# Patient Record
Sex: MALE | Employment: FULL TIME | URBAN - METROPOLITAN AREA
[De-identification: names, ages, dates, MRNs, and addresses within clinical notes are randomized per-mention and may not be internally consistent; named-entity substitution may affect disease eponyms.]

---

## 2020-10-05 ENCOUNTER — CLINICAL SUPPORT (OUTPATIENT)
Dept: REHABILITATION | Facility: HOSPITAL | Age: 30
End: 2020-10-05
Attending: ORTHOPAEDIC SURGERY
Payer: COMMERCIAL

## 2020-10-05 DIAGNOSIS — M25.551 CHRONIC RIGHT HIP PAIN: ICD-10-CM

## 2020-10-05 DIAGNOSIS — G89.29 CHRONIC RIGHT HIP PAIN: ICD-10-CM

## 2020-10-05 PROCEDURE — 97161 PT EVAL LOW COMPLEX 20 MIN: CPT | Performed by: PHYSICAL THERAPIST

## 2020-10-05 PROCEDURE — 97140 MANUAL THERAPY 1/> REGIONS: CPT | Performed by: PHYSICAL THERAPIST

## 2020-10-05 PROCEDURE — 97110 THERAPEUTIC EXERCISES: CPT | Performed by: PHYSICAL THERAPIST

## 2020-10-05 NOTE — PLAN OF CARE
"[facility]  Physical Therapy Initial Evaluation    Name: Yuan Bains  Clinic Number: [id number]    Therapy Diagnosis:   Encounter Diagnosis   Name Primary?    Chronic right hip pain      Physician: Selwyn Gonzalez MD    Physician Orders: PT Eval and Treat   Medical Diagnosis: M25.551 (ICD-10-CM) - Right hip pain     Evaluation Date: 10/5/2020  Authorization Period Expiration:  12/31/2020  Plan of Care Certification Period: 1/5/2021  Visit # / Visits authorized: 1/ 20    Time In: 13:30  Time Out: 14:30  Total Billable Time: 60 minutes    Precautions: Standard    Subjective   Date of onset: 1 yr hx   History of current condition - Yuan reports injuring his R hip playing football noting "It felt like I pulled my groin"  Saw DC treated for "scoliosis" and did do 1.5 months of PT in [place] without improvement in his condition, moved to [facility] and saw Ortho, referred to PT again after reviewing xrays/MRI "small labral tear" in R hip   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  Yuan Bains  has no past surgical history on file.  PMH: (+) arrhythmia - related to caffeine use      Yuan currently has no medications in their medication list.    Review of patient's allergies indicates:  Not on File     Pain:  Current 3/10, worst 9/10, best 3/10   Location: right hip   Description: Aching  Aggravating Factors: Sitting and working out and sports   Easing Factors: rest    Prior Therapy: yes   Social History: NA   Occupation: computer work for [employer]   Prior Level of Function: I  Current Level of Function: restricted     Pts goals: "learn how to take care of my hip"     Objective     Observation: neg gait deviation, mild increase in L' lordosis     Range of Motion (Passive):   Hip Right Left   Flexion WFLs WFLs   Extension WFLs WFLs   Abduction WFLs WFLs   Adduction NT  NT    External Rotation 60  60   Internal Rotation 20 20     Strength: Hip   Right Left " Comment   Flexion 4-/5 5/5    Extension 4-/5 5/5    Abduction: 4-/5 5/5    Adduction 4-/5    5/5       External Rotation 4-/5    5/5       Internal Rotation 4-/5    5/5         Special Tests: (+) MARIE, equivocal FADIR, (+) modified Eagle test     Palpation: (-) TTP t/o R hip with high tone noted in adductor region       CMS Impairment/Limitation/Restriction for FOTO   Survey    Therapist reviewed FOTO scores for Yuan Bains on 10/5/2020.   FOTO documents entered into [employer] - see Media section.    Limitation Score: 69%  Category: Mobility       TREATMENT   Treatment Time In: 14:00  Treatment Time Out: 14:30  Total Treatment time separate from Evaluation time:30'    Yuan received therapeutic exercises to develop strength, endurance, ROM, flexibility and core stabilization for 15 minutes including:    Supine RF stretch 5x:15   butt winking 1x10:10   B bridge 1x10:10   Quad TA 1x10:10     Yuan received the following manual therapy techniques: Joint mobilizations were applied to the: R hip  for 10 minutes, including:    Long and short axis distraction grade III    Lateral glide grade III   Anterior glide grade III     Education     Home Exercises and Patient Education Provided    Education provided re:   - progress towards goals   - role of therapy in multi - disciplinary team, goals for therapy  No spiritual or educational barriers to learning provided    Written Home Exercises Provided: yes .  Exercises were reviewed and Yuan was able to demonstrate them prior to the end of the session.   Pt received a written copy of exercises to perform at home. Yuan demonstrated good  understanding of the education provided.     Assessment   Yuan is a 30 y.o. male referred to outpatient Physical Therapy with a medical diagnosis of chronic R hip pain . Pt presents with       Pain  Stiffness  Decreased ROM  Decreased strength   Decreased functional status       Pt prognosis is Good.   Pt will benefit from skilled outpatient Physical  Therapy to address the deficits stated above and in the chart below, provide pt/family education, and to maximize pt's level of independence.     Plan of care discussed with patient: Yes  Pt's spiritual, cultural and educational needs considered and pt agreeable to plan of care and goals as stated below:     Anticipated Barriers for therapy: none     Medical Necessity is demonstrated by the following  History  Co-morbidities and personal factors that may impact the plan of care Co-morbidities:   none     Personal Factors:   no deficits     low   Examination  Body Structures and Functions, activity limitations and participation restrictions that may impact the plan of care Body Regions:   lower extremities    Body Systems:    strength  motor control    Participation Restrictions:   Working out   Sports     Activity limitations:   Mobility  lifting and carrying objects    Self care  no deficits    Domestic Life  no deficits    Community and Social Life  recreation and leisure         low   Clinical Presentation stable and uncomplicated low   Decision Making/ Complexity Score: low     Goals     Short-Term Goals: 6 weeks  - The patient will be independent with initial home exercise program.  - The patient will increase strength by 1/2 muscle grade to perform ambulation and bathing and hygiene with pain < 5/10.    Long-Term Goals: 12  weeks  - The patient will be independent with home exercise program and symptom management.  -The patient will increase strength = to uninvolved knee  to perform ambulation, toileting, dressing and recreation/leisure activities   with pain < 2/10.      Plan   Certification Period: 10/5/2020 to 1/5/2021.    Outpatient Physical Therapy 1 times weekly for 12 weeks to include the following interventions: patient education, Manual Therapy, Moist Heat/ Ice, Neuromuscular Re-ed, Patient Education, Therapeutic Activites and Therapeutic Exercise.     Yuan Astudillo, PT

## 2020-10-20 ENCOUNTER — CLINICAL SUPPORT (OUTPATIENT)
Dept: REHABILITATION | Facility: HOSPITAL | Age: 30
End: 2020-10-20
Attending: ORTHOPAEDIC SURGERY
Payer: COMMERCIAL

## 2020-10-20 DIAGNOSIS — G89.29 CHRONIC RIGHT HIP PAIN: ICD-10-CM

## 2020-10-20 DIAGNOSIS — M25.551 CHRONIC RIGHT HIP PAIN: ICD-10-CM

## 2020-10-20 PROCEDURE — 97140 MANUAL THERAPY 1/> REGIONS: CPT | Performed by: PHYSICAL THERAPIST

## 2020-10-20 PROCEDURE — 97110 THERAPEUTIC EXERCISES: CPT | Performed by: PHYSICAL THERAPIST

## 2020-10-20 NOTE — PROGRESS NOTES
Physical Therapy Daily Treatment Note     Visit Date: 10/20/2020    Name: Yuan Bains  [facility] Number: [id number]    Therapy Diagnosis:   Encounter Diagnosis   Name Primary?    Chronic right hip pain      Physician: Selwyn Gonzalez MD    Physician Orders: PT Eval and Treat   Medical Diagnosis: M25.551 (ICD-10-CM) - Right hip pain     Evaluation Date: 10/5/2020  Authorization Period Expiration:  12/31/2020  Plan of Care Certification Period: 1/5/2021  Visit # / Visits authorized: 2/ 20    Time In: 13:30  Time Out: 14:30  Total Billable Time: 45 minutes    Precautions: Standard    Subjective      Pt reports:slight improvement in his condition after IE/HEP, but despite improved L glut activation, still feels significant weakness     he was compliant with home exercise program given last session.   Response to previous treatment:good   Functional change: none as yet     Pain: 0/10  Location: right hip      Objective     Measurements taken:   None taken this visit     received the following manual therapy techniques: Joint mobilizations were applied to the Right hip :  For 8  minutes.     Long axis distraction grade III  Short axis distraction grade III + hip ER   Lateral glide grade III  Anterior glide grade III    Yuan received therapeutic exercises to develop strength, endurance, flexibility and core stabilization for 30 minutes including:    Supine RF stretch 5x:15 R  Prone RF stretch 5x;15 R  1/2 kneeling RF stretch 5x:15 R  Butt winking 1x10:10   B bridge with R foot on med ball 2x10:10  Quad hip ext R/L 2x10  Quad donkey kick 2x10 R/L     Home Exercises Provided and Patient Education Provided     Education provided:   - none provided this visit     Written Home Exercises Provided: Patient instructed to cont prior HEP.  Exercises were reviewed and Yuan was able to demonstrate them prior to the end of the session.  Yuan demonstrated good  understanding of the education provided.     See EMR under hand out  for  exercises provided prior visit.      Assessment     klever Rx without increase in sxs, improved mobility post Rx in R hip     Yuan is progressing well towards his goals.   Pt prognosis is Good.     Pt will continue to benefit from skilled outpatient physical therapy to address the deficits listed in the problem list box on initial evaluation, provide pt/family education and to maximize pt's level of independence in the home and community environment.     Pt's spiritual, cultural and educational needs considered and pt agreeable to plan of care and goals.    Anticipated barriers to physical therapy none     Short-Term Goals: 6 weeks  - The patient will be independent with initial home exercise program.  - The patient will increase strength by 1/2 muscle grade to perform ambulation and bathing and hygiene with pain < 5/10.    Long-Term Goals: 12  weeks  - The patient will be independent with home exercise program and symptom management.  -The patient will increase strength = to uninvolved knee  to perform ambulation, toileting, dressing and recreation/leisure activities   with pain < 2/10.      Plan     Shoulder eval NV per script     Continue with established Plan of Care towards PT goals with focus on decreasing pain, increasing ROM, strength, neuromuscular control and functional status       Yuan Astudillo, PT

## 2020-11-03 ENCOUNTER — CLINICAL SUPPORT (OUTPATIENT)
Dept: REHABILITATION | Facility: HOSPITAL | Age: 30
End: 2020-11-03
Attending: ORTHOPAEDIC SURGERY
Payer: COMMERCIAL

## 2020-11-03 DIAGNOSIS — G89.29 CHRONIC LEFT SHOULDER PAIN: ICD-10-CM

## 2020-11-03 DIAGNOSIS — M25.512 CHRONIC LEFT SHOULDER PAIN: ICD-10-CM

## 2020-11-03 PROCEDURE — 97161 PT EVAL LOW COMPLEX 20 MIN: CPT | Performed by: PHYSICAL THERAPIST

## 2020-11-03 PROCEDURE — 97110 THERAPEUTIC EXERCISES: CPT | Performed by: PHYSICAL THERAPIST

## 2020-11-05 PROBLEM — G89.29 CHRONIC LEFT SHOULDER PAIN: Status: ACTIVE | Noted: 2020-11-05

## 2020-11-05 PROBLEM — M25.512 CHRONIC LEFT SHOULDER PAIN: Status: ACTIVE | Noted: 2020-11-05

## 2020-11-05 NOTE — PLAN OF CARE
"[facility]  Physical Therapy Initial Evaluation    Name: Yuan Bains  Clinic Number: [id number]    Therapy Diagnosis:   Encounter Diagnosis   Name Primary?    Chronic left shoulder pain      Physician: Selwyn Gonzalez MD    Physician Orders: PT Eval and Treat   Medical Diagnosis: M67.813 (ICD-10-CM) - Other specified disorders of tendon, right shoulder  (acutally the left)  Evaluation Date: 11/3/2020  Authorization Period Expiration: 12/31/2020  Plan of Care Certification Period: 2/3/2021  Visit # / Visits authorized: 2 / 20    Time In: 15:30  Time Out: 16:30  Total Billable Time: 60 minutes    Precautions: Standard    Subjective   Date of onset: 8/2020  History of current condition - Yuan reports that he was doing P90X workouts from March - July but in August, he began having L shoulder pain of insidious onset, this did not resolve and he saw MD, xrays (-) and no MRI performed, referred to PT   Pt is R arm dominant    No past medical history on file.  Yuan Bains  has no past surgical history on file.    Yuan currently has no medications in their medication list.    Review of patient's allergies indicates:  Not on File     Pain:  Current 2/10, worst 10/10, best 2/10   Location: left shoulder  "under the collar bone" and in AC joint    Description: Aching  Aggravating Factors: reaching [place], working out   Easing Factors: rest, HAbd, stretching pec minor /lat     Prior Therapy: none  Social History:  lives with family   Occupation: see hip eval   Prior Level of Function: I  Current Level of Function: restricted - unable to work out or  child      Pts goals: "learn exercises, improve ROM and decrease the pain"     Objective     Observation: fair unsupported sitting posture, obvious scap malpositioning, no muscle atrophy or guarding L shoulder     Range of Motion:   AROM Right Left Comment   Shoulder Elevatiom: 160 degrees 160 degrees    PROM Right Left Comment   Shoulder Flexion: 165 " "degrees 127 degrees    Shoulder Abduction: 180  degrees 90 degrees    Shoulder ER, 90°ABD: 105 degrees 55 degrees    Shoulder IR, 90° ABD: 50 degrees 33 degrees      FIR -5"     Strength:    Right Left Comment   Shoulder flexion: 5/5 3-/5    Shoulder Abduction: 5/5 3-/5    Shoulder ER: 5/5 4/5    Shoulder IR: 5/5 4/5      Scapular Control/Dyskinesis: yes    Special Tests: (+) Perez, (+) empty can     Palpation: (+) TTP axilla       CMS Impairment/Limitation/Restriction for FOTO  Survey    Therapist reviewed FOTO scores for Yuan Bains on 11/3/2020.   FOTO documents entered into [employer] - see Media section.    Limitation Score: 69%  Category: Carrying       TREATMENT   Treatment Time In: 16:00  Treatment Time Out: 16:30  Total Treatment time separate from Evaluation time:15'     Yuan received therapeutic exercises to develop strength, endurance and posture for 15 minutes including:    Supine lying over 1/2 roll x 7'   Trigger point release in axilla   y-t-w 3x10 0# off EOT  Prone scap repositioning 1x15:05  Prone s' ext / ER 3x15 0#    Education     Home Exercises Provided and Patient Education Provided         Education provided:   - rehab approach and role of scapula in s' function     Written Home Exercises Provided: yes.  Exercises were reviewed and Yuan was able to demonstrate them prior to the end of the session.  Yuan demonstrated good  understanding of the education provided.     See EMR under hand out  for exercises provided 11/3/2020.      Assessment   Yuan is a 30 y.o. male referred to outpatient Physical Therapy with a medical diagnosis of chronic L shoulder pain . Pt presents with       Pain  Decreased ROM  Decreased strength  Decreased functional status       Pt prognosis is Good.   Pt will benefit from skilled outpatient Physical Therapy to address the deficits stated above and in the chart below, provide pt/family education, and to maximize pt's level of independence.     Plan of care discussed with " patient: Yes  Pt's spiritual, cultural and educational needs considered and pt agreeable to plan of care and goals as stated below:     Anticipated Barriers for therapy: none     Medical Necessity is demonstrated by the following  History  Co-morbidities and personal factors that may impact the plan of care Co-morbidities:   none     Personal Factors:   no deficits     low   Examination  Body Structures and Functions, activity limitations and participation restrictions that may impact the plan of care Body Regions:   upper extremities    Body Systems:    ROM  strength  motor control    Participation Restrictions:   ADLs      Activity limitations:   Mobility  lifting and carrying objects    Self care  washing oneself (bathing, drying, washing hands)  dressing    Domestic Life  shopping  cooking  doing house work (cleaning house, washing dishes, laundry)    Community and Social Life  recreation and leisure         low   Clinical Presentation stable and uncomplicated low   Decision Making/ Complexity Score: low     Goals     Short-Term Goals: 6 weeks  - The patient will be independent with initial home exercise program.  - The patient will demonstrate good unsupported sitting posture with min verbal cues for 15 minutes.  - The patient will increase ROM 15 degrees to perform bathing and hygiene, grooming, toileting and dressing with pain < 010.  - The patient will increase strength by 1.2 muscle grade  to perform bathing and hygiene, grooming, toileting and dressing with pain < 0/10.    Long-Term Goals: 12 weeks  - The patient will be independent with home exercise program and symptom management.  - The patient will increase ROM = to uninvolved shoulder  to perform work duties and recreation/leisure activities   with pain < 0/10.  - The patient will increase strength = to uninvolved shoulder  to perform work duties and recreation/leisure activities   with pain < 0/10.      Plan   Certification Period: 11/3/2020 to  2/3/2021.    Outpatient Physical Therapy 1 times weekly for 3 months to include the following interventions: patient education, Manual Therapy, Moist Heat/ Ice, Neuromuscular Re-ed, Patient Education, Therapeutic Activites and Therapeutic Exercise.     Yuan Astudillo, PT

## 2020-11-30 ENCOUNTER — CLINICAL SUPPORT (OUTPATIENT)
Dept: REHABILITATION | Facility: HOSPITAL | Age: 30
End: 2020-11-30
Attending: ORTHOPAEDIC SURGERY
Payer: COMMERCIAL

## 2020-11-30 DIAGNOSIS — G89.29 CHRONIC RIGHT HIP PAIN: ICD-10-CM

## 2020-11-30 DIAGNOSIS — M25.551 CHRONIC RIGHT HIP PAIN: ICD-10-CM

## 2020-11-30 PROCEDURE — 97140 MANUAL THERAPY 1/> REGIONS: CPT | Performed by: PHYSICAL THERAPIST

## 2020-11-30 PROCEDURE — 97110 THERAPEUTIC EXERCISES: CPT | Performed by: PHYSICAL THERAPIST

## 2020-11-30 NOTE — PROGRESS NOTES
"  Physical Therapy Daily Treatment Note     Visit Date: 11/30/2020    Name: Yuan Sands Number: [id number]    Therapy Diagnosis:   Encounter Diagnosis   Name Primary?    Chronic right hip pain      Physician: Selwyn Gonzalez MD    Physician Orders: PT Eval and Treat   Medical Diagnosis: M25.551 (ICD-10-CM) - Right hip pain     Evaluation Date: 10/5/2020  Authorization Period Expiration:  12/31/2020  Plan of Care Certification Period: 1/5/2021  Visit # / Visits authorized: 3/ 20    Time In: 15:30  Time Out: 16:30  Total Billable Time: 45 minutes    Precautions: Standard    Subjective      Pt reports:"My shoulder feel amazingly better but I'm still having pain in my hip"      he was compliant with home exercise program given last session.   Response to previous treatment:good   Functional change: none as yet     Pain:  NA / 10   Location: right hip      Objective     Measurements taken:   None taken this visit     received the following manual therapy techniques: Joint mobilizations were applied to the Right hip :  For 8  minutes.     Long axis distraction grade III    Yuan received therapeutic exercises to develop strength, endurance, flexibility and core stabilization for 30 minutes including:    Bike x 10' 5.0 seat at 11  Supine RF stretch 5x:15 R  Prone RF stretch 5x;15 R  1/2 kneeling RF stretch 5x:15 R  B bridge with back on wt bench 3x10:05   Hip abd band walks 3xburn R/L green  1/2 kneeling chop 2x10 R/L 7p     Home Exercises Provided and Patient Education Provided     Education provided:   - none provided this visit     Written Home Exercises Provided: Patient instructed to cont prior HEP.  Exercises were reviewed and Yuan was able to demonstrate them prior to the end of the session.  Yuan demonstrated good  understanding of the education provided.     See EMR under hand out  for exercises provided prior visit.      Assessment     klever Rx without increase in sxs, improved glut activation     Yuan is " progressing well towards his goals.   Pt prognosis is Good.     Pt will continue to benefit from skilled outpatient physical therapy to address the deficits listed in the problem list box on initial evaluation, provide pt/family education and to maximize pt's level of independence in the home and community environment.     Pt's spiritual, cultural and educational needs considered and pt agreeable to plan of care and goals.    Anticipated barriers to physical therapy none     Short-Term Goals: 6 weeks  - The patient will be independent with initial home exercise program.  - The patient will increase strength by 1/2 muscle grade to perform ambulation and bathing and hygiene with pain < 5/10.    Long-Term Goals: 12  weeks  - The patient will be independent with home exercise program and symptom management.  -The patient will increase strength = to uninvolved knee  to perform ambulation, toileting, dressing and recreation/leisure activities   with pain < 2/10.      Plan     Focus on decreasing pain and increasing glut/core activation in R hip condition     Continue with established Plan of Care towards PT goals with focus on decreasing pain, increasing ROM, strength, neuromuscular control and functional status       Yuan Astudillo, PT

## 2020-12-01 RX ORDER — PROPOFOL 10 MG/ML
INJECTION, EMULSION INTRAVENOUS
Status: DISPENSED
Start: 2020-12-01 | End: 2020-12-02

## 2020-12-14 ENCOUNTER — CLINICAL SUPPORT (OUTPATIENT)
Dept: REHABILITATION | Facility: HOSPITAL | Age: 30
End: 2020-12-14
Attending: ORTHOPAEDIC SURGERY
Payer: COMMERCIAL

## 2020-12-14 DIAGNOSIS — G89.29 CHRONIC RIGHT HIP PAIN: ICD-10-CM

## 2020-12-14 DIAGNOSIS — M25.551 CHRONIC RIGHT HIP PAIN: ICD-10-CM

## 2020-12-14 PROCEDURE — 97110 THERAPEUTIC EXERCISES: CPT | Performed by: PHYSICAL THERAPIST

## 2020-12-14 NOTE — PROGRESS NOTES
Physical Therapy Daily Treatment Note     Visit Date: 12/14/2020    Name: Yuan Sands Number: [id number]    Therapy Diagnosis:   Muscle weakness lower extremity     Physician: Selwyn Gonzalez MD    Physician Orders: PT Eval and Treat   Medical Diagnosis: M25.551 (ICD-10-CM) - Right hip pain     Evaluation Date: 10/5/2020  Authorization Period Expiration:  12/31/2020  Plan of Care Certification Period: 1/5/2021  Visit # / Visits authorized: 4/ 20    Time In: 15:30  Time Out: 16:30  Total Billable Time: 45 minutes    Precautions: Standard    Subjective      Pt reports:improvement in R hip condition, reiterated his desire to return to functional fitness training     he was compliant with home exercise program given last session.   Response to previous treatment:good   Functional change: none as yet     Pain:  NA / 10   Location: right hip      Objective     Measurements taken:   Decreased Hamstring flexibility R/L with inability to touch toes in st or long sit      Yuan received therapeutic exercises to develop strength, endurance, flexibility and core stabilization for 45 minutes including:    Plum cook band assisted curl up 2x10  Inch worms 2x3  Lunge series dynamic stretching   OHDS core assist 1x5  OHDS glut assist facing wall 1x5  T' rotation in L' locked position 1x3 PT AA  Open book T' stretch 3xs R/L        Home Exercises Provided and Patient Education Provided     Education provided:   - none provided this visit     Written Home Exercises Provided: Patient instructed to cont prior HEP.  Exercises were reviewed and Yuan was able to demonstrate them prior to the end of the session.  Yuan demonstrated good  understanding of the education provided.     See EMR under hand out  for exercises provided prior visit.      Assessment     klever Rx without increase in sxs, improved squat technique and ham flexibility following core activation     Yuan is progressing well towards his goals.   Pt prognosis is Good.      Pt will continue to benefit from skilled outpatient physical therapy to address the deficits listed in the problem list box on initial evaluation, provide pt/family education and to maximize pt's level of independence in the home and community environment.     Pt's spiritual, cultural and educational needs considered and pt agreeable to plan of care and goals.    Anticipated barriers to physical therapy none     Short-Term Goals: 6 weeks  - The patient will be independent with initial home exercise program.  - The patient will increase strength by 1/2 muscle grade to perform ambulation and bathing and hygiene with pain < 5/10.    Long-Term Goals: 12  weeks  - The patient will be independent with home exercise program and symptom management.  -The patient will increase strength = to uninvolved knee  to perform ambulation, toileting, dressing and recreation/leisure activities   with pain < 2/10.      Plan     Focus on decreasing pain and increasing glut/core activation in R hip condition     Continue with established Plan of Care towards PT goals with focus on decreasing pain, increasing ROM, strength, neuromuscular control and functional status       Yuan Astudillo, PT

## 2020-12-22 ENCOUNTER — CLINICAL SUPPORT (OUTPATIENT)
Dept: REHABILITATION | Facility: HOSPITAL | Age: 30
End: 2020-12-22
Attending: ORTHOPAEDIC SURGERY
Payer: COMMERCIAL

## 2020-12-22 DIAGNOSIS — M25.551 CHRONIC RIGHT HIP PAIN: ICD-10-CM

## 2020-12-22 DIAGNOSIS — G89.29 CHRONIC RIGHT HIP PAIN: ICD-10-CM

## 2020-12-22 PROCEDURE — 97110 THERAPEUTIC EXERCISES: CPT | Performed by: PHYSICAL THERAPIST

## 2020-12-23 NOTE — PROGRESS NOTES
Physical Therapy Daily Treatment Note     Visit Date: 12/22/2020    Name: Yuan Bains  Clinic Number: [id number]    Therapy Diagnosis:   Muscle weakness lower extremity     Physician: Selwyn Gonzalez MD    Physician Orders: PT Eval and Treat   Medical Diagnosis: M25.551 (ICD-10-CM) - Right hip pain     Evaluation Date: 10/5/2020  Authorization Period Expiration:  12/31/2020  Plan of Care Certification Period: 1/5/2021  Visit # / Visits authorized: 5/ 20    Time In: 10:30  Time Out: 11:30  Total Billable Time: 45 minutes    Precautions: Standard    Subjective      Pt reports:continued progress in his R hip condition     he was compliant with home exercise program given last session.   Response to previous treatment:good   Functional change: none as yet     Pain:  NA / 10   Location: right hip      Objective     Measurements taken:  Continued  Decreased Hamstring flexibility R/L with inability to touch toes in st or long sit      Yuan received therapeutic exercises to develop strength, endurance, flexibility and core stabilization for 45 minutes including:    yll cook band assisted curl up 2x10  Toe touch movement pattern with yll cook band FW 1x10 and facing away 1x10  1/2 kneeling cc lifting facing forward 3x10 R/L 3p   Single leg ground touch 3x5 R/L    grade III long axis distraction R hip     Home Exercises Provided and Patient Education Provided     Education provided:   - none provided this visit     Written Home Exercises Provided: Patient instructed to cont prior HEP.  Exercises were reviewed and Yuan was able to demonstrate them prior to the end of the session.  Yuan demonstrated good  understanding of the education provided.     See EMR under hand out  for exercises provided prior visit.      Assessment     klever Rx without increase in sxs except pt had R hip pain on 3rd set of ground touch, significant improvement in ham flexibility following core activation     Yuan is progressing well towards his goals.    Pt prognosis is Good.     Pt will continue to benefit from skilled outpatient physical therapy to address the deficits listed in the problem list box on initial evaluation, provide pt/family education and to maximize pt's level of independence in the home and community environment.     Pt's spiritual, cultural and educational needs considered and pt agreeable to plan of care and goals.    Anticipated barriers to physical therapy none     Short-Term Goals: 6 weeks  - The patient will be independent with initial home exercise program.  - The patient will increase strength by 1/2 muscle grade to perform ambulation and bathing and hygiene with pain < 5/10.    Long-Term Goals: 12  weeks  - The patient will be independent with home exercise program and symptom management.  -The patient will increase strength = to uninvolved knee  to perform ambulation, toileting, dressing and recreation/leisure activities   with pain < 2/10.      Plan     Focus on decreasing pain and increasing glut/core activation in R hip condition     Continue with established Plan of Care towards PT goals with focus on decreasing pain, increasing ROM, strength, neuromuscular control and functional status       Yuan Astudillo, PT

## 2020-12-31 ENCOUNTER — CLINICAL SUPPORT (OUTPATIENT)
Dept: REHABILITATION | Facility: HOSPITAL | Age: 30
End: 2020-12-31
Attending: ORTHOPAEDIC SURGERY
Payer: COMMERCIAL

## 2020-12-31 DIAGNOSIS — G89.29 CHRONIC RIGHT HIP PAIN: ICD-10-CM

## 2020-12-31 DIAGNOSIS — M25.551 CHRONIC RIGHT HIP PAIN: ICD-10-CM

## 2020-12-31 PROCEDURE — 97530 THERAPEUTIC ACTIVITIES: CPT | Performed by: PHYSICAL THERAPIST

## 2020-12-31 NOTE — PROGRESS NOTES
Physical Therapy Daily Treatment Note     Visit Date: 12/31/2020    Name: Yuan Sands Number: [id number]    Therapy Diagnosis:   Muscle weakness lower extremity     Physician: Selwyn Gonzalez MD    Physician Orders: PT Eval and Treat   Medical Diagnosis: M25.551 (ICD-10-CM) - Right hip pain     Evaluation Date: 10/5/2020  Authorization Period Expiration:  12/31/2020  Plan of Care Certification Period: 1/5/2021  Visit # / Visits authorized: 6/ 20    Time In: 10:30  Time Out: 11:30  Total Billable Time: 45 minutes    Precautions: Standard    Subjective      Pt reports:no new c/o this AM in R hip, willing to attempt Adele as tolerated     he was compliant with home exercise program given last session.   Response to previous treatment:good   Functional change: none as yet     Pain:  NA / 10   Location: right hip      Objective     Measurements taken:  None taken this visit     Patient participated in dynamic functional therapeutic activities to improve functional performance for 45  minutes. Including:     ET x 10'   band assisted curl up 2x10  Dynamic warm up   LE dynamic stretch   Shuttle jump series:  B jumps 2b 3x15  U jumps 1b 3x10  alt jumps 1b 2x10  Ladder series 5d x 2  theragun to R L' paraspinals     Home Exercises Provided and Patient Education Provided     Education provided:   - none provided this visit     Written Home Exercises Provided: Patient instructed to cont prior HEP.  Exercises were reviewed and Yuan was able to demonstrate them prior to the end of the session.  Yuan demonstrated good  understanding of the education provided.     See EMR under hand out  for exercises provided prior visit.      Assessment     klever Rx without increase in sxs except for high tone/soreness in R L'  Region, relieved with theragun treatment     Yuan is progressing well towards his goals.   Pt prognosis is Good.     Pt will continue to benefit from skilled outpatient physical therapy to address the deficits  listed in the problem list box on initial evaluation, provide pt/family education and to maximize pt's level of independence in the home and community environment.     Pt's spiritual, cultural and educational needs considered and pt agreeable to plan of care and goals.    Anticipated barriers to physical therapy none     Short-Term Goals: 6 weeks  - The patient will be independent with initial home exercise program.  - The patient will increase strength by 1/2 muscle grade to perform ambulation and bathing and hygiene with pain < 5/10.    Long-Term Goals: 12  weeks  - The patient will be independent with home exercise program and symptom management.  -The patient will increase strength = to uninvolved knee  to perform ambulation, toileting, dressing and recreation/leisure activities   with pain < 2/10.      Plan     Focus on decreasing pain and increasing glut/core activation in R hip condition     Continue with established Plan of Care towards PT goals with focus on decreasing pain, increasing ROM, strength, neuromuscular control and functional status       Yuan Astudillo, PT

## 2021-01-04 ENCOUNTER — CLINICAL SUPPORT (OUTPATIENT)
Dept: REHABILITATION | Facility: HOSPITAL | Age: 31
End: 2021-01-04
Attending: ORTHOPAEDIC SURGERY
Payer: COMMERCIAL

## 2021-01-04 DIAGNOSIS — G89.29 CHRONIC RIGHT HIP PAIN: ICD-10-CM

## 2021-01-04 DIAGNOSIS — M25.551 CHRONIC RIGHT HIP PAIN: ICD-10-CM

## 2021-01-04 PROCEDURE — 97530 THERAPEUTIC ACTIVITIES: CPT | Performed by: PHYSICAL THERAPIST

## 2021-01-11 ENCOUNTER — CLINICAL SUPPORT (OUTPATIENT)
Dept: REHABILITATION | Facility: HOSPITAL | Age: 31
End: 2021-01-11
Attending: ORTHOPAEDIC SURGERY
Payer: COMMERCIAL

## 2021-01-11 DIAGNOSIS — G89.29 CHRONIC RIGHT HIP PAIN: ICD-10-CM

## 2021-01-11 DIAGNOSIS — M25.551 CHRONIC RIGHT HIP PAIN: ICD-10-CM

## 2021-01-11 PROCEDURE — 97530 THERAPEUTIC ACTIVITIES: CPT | Performed by: PHYSICAL THERAPIST

## 2021-01-20 ENCOUNTER — CLINICAL SUPPORT (OUTPATIENT)
Dept: REHABILITATION | Facility: HOSPITAL | Age: 31
End: 2021-01-20
Attending: ORTHOPAEDIC SURGERY
Payer: COMMERCIAL

## 2021-01-20 DIAGNOSIS — G89.29 CHRONIC RIGHT HIP PAIN: ICD-10-CM

## 2021-01-20 DIAGNOSIS — M25.551 CHRONIC RIGHT HIP PAIN: ICD-10-CM

## 2021-01-20 PROCEDURE — 97530 THERAPEUTIC ACTIVITIES: CPT | Performed by: PHYSICAL THERAPIST

## 2021-01-26 ENCOUNTER — CLINICAL SUPPORT (OUTPATIENT)
Dept: REHABILITATION | Facility: HOSPITAL | Age: 31
End: 2021-01-26
Attending: ORTHOPAEDIC SURGERY
Payer: COMMERCIAL

## 2021-01-26 DIAGNOSIS — M25.551 CHRONIC RIGHT HIP PAIN: ICD-10-CM

## 2021-01-26 DIAGNOSIS — G89.29 CHRONIC RIGHT HIP PAIN: ICD-10-CM

## 2021-01-26 PROCEDURE — 97530 THERAPEUTIC ACTIVITIES: CPT | Performed by: PHYSICAL THERAPIST

## 2021-08-06 ENCOUNTER — IMMUNIZATION (OUTPATIENT)
Dept: INTERNAL MEDICINE | Facility: CLINIC | Age: 31
End: 2021-08-06
Payer: COMMERCIAL

## 2021-08-06 DIAGNOSIS — Z23 NEED FOR VACCINATION: Primary | ICD-10-CM

## 2021-08-06 PROCEDURE — 91300 COVID-19, MRNA, LNP-S, PF, 30 MCG/0.3 ML DOSE VACCINE: CPT | Mod: ,,, | Performed by: INTERNAL MEDICINE

## 2021-08-06 PROCEDURE — 0001A COVID-19, MRNA, LNP-S, PF, 30 MCG/0.3 ML DOSE VACCINE: ICD-10-PCS | Mod: CV19,,, | Performed by: INTERNAL MEDICINE

## 2021-08-06 PROCEDURE — 0001A COVID-19, MRNA, LNP-S, PF, 30 MCG/0.3 ML DOSE VACCINE: CPT | Mod: CV19,,, | Performed by: INTERNAL MEDICINE

## 2021-08-06 PROCEDURE — 91300 COVID-19, MRNA, LNP-S, PF, 30 MCG/0.3 ML DOSE VACCINE: ICD-10-PCS | Mod: ,,, | Performed by: INTERNAL MEDICINE

## 2021-09-20 ENCOUNTER — TELEPHONE (OUTPATIENT)
Dept: INTERNAL MEDICINE | Facility: CLINIC | Age: 31
End: 2021-09-20

## 2021-09-27 ENCOUNTER — IMMUNIZATION (OUTPATIENT)
Dept: INTERNAL MEDICINE | Facility: CLINIC | Age: 31
End: 2021-09-27
Payer: COMMERCIAL

## 2021-09-27 DIAGNOSIS — Z23 NEED FOR VACCINATION: Primary | ICD-10-CM

## 2021-09-27 PROCEDURE — 91300 COVID-19, MRNA, LNP-S, PF, 30 MCG/0.3 ML DOSE VACCINE: CPT | Mod: PBBFAC | Performed by: INTERNAL MEDICINE

## 2021-09-27 PROCEDURE — 0002A COVID-19, MRNA, LNP-S, PF, 30 MCG/0.3 ML DOSE VACCINE: CPT | Mod: PBBFAC | Performed by: INTERNAL MEDICINE

## 2023-03-16 ENCOUNTER — OFFICE VISIT (OUTPATIENT)
Dept: URGENT CARE | Facility: CLINIC | Age: 33
End: 2023-03-16
Payer: COMMERCIAL

## 2023-03-16 VITALS
SYSTOLIC BLOOD PRESSURE: 135 MMHG | TEMPERATURE: 98 F | HEIGHT: 74 IN | OXYGEN SATURATION: 98 % | RESPIRATION RATE: 17 BRPM | WEIGHT: 175 LBS | BODY MASS INDEX: 22.46 KG/M2 | HEART RATE: 76 BPM | DIASTOLIC BLOOD PRESSURE: 87 MMHG

## 2023-03-16 DIAGNOSIS — J32.9 SINUSITIS, UNSPECIFIED CHRONICITY, UNSPECIFIED LOCATION: ICD-10-CM

## 2023-03-16 DIAGNOSIS — J02.9 SORE THROAT: ICD-10-CM

## 2023-03-16 DIAGNOSIS — J31.0 RHINITIS, UNSPECIFIED TYPE: Primary | ICD-10-CM

## 2023-03-16 LAB
CTP QC/QA: YES
MOLECULAR STREP A: NEGATIVE

## 2023-03-16 PROCEDURE — 87651 STREP A DNA AMP PROBE: CPT | Mod: QW,S$GLB,, | Performed by: NURSE PRACTITIONER

## 2023-03-16 PROCEDURE — 99203 OFFICE O/P NEW LOW 30 MIN: CPT | Mod: S$GLB,,, | Performed by: NURSE PRACTITIONER

## 2023-03-16 PROCEDURE — 87651 POCT STREP A MOLECULAR: ICD-10-PCS | Mod: QW,S$GLB,, | Performed by: NURSE PRACTITIONER

## 2023-03-16 PROCEDURE — 99203 PR OFFICE/OUTPT VISIT, NEW, LEVL III, 30-44 MIN: ICD-10-PCS | Mod: S$GLB,,, | Performed by: NURSE PRACTITIONER

## 2023-03-16 RX ORDER — FLUTICASONE PROPIONATE 50 MCG
2 SPRAY, SUSPENSION (ML) NASAL DAILY
Qty: 15.8 ML | Refills: 2 | Status: SHIPPED | OUTPATIENT
Start: 2023-03-16 | End: 2023-03-23

## 2023-03-16 RX ORDER — DEXTROAMPHETAMINE SULFATE 15 MG/1
15 CAPSULE, EXTENDED RELEASE ORAL EVERY MORNING
COMMUNITY
Start: 2023-02-14

## 2023-03-16 NOTE — PATIENT INSTRUCTIONS
See additional patient Instructions provided    - Rest.    - Drink plenty of fluids.  - Bacterial infections can be treated with oral antibiotics, however, Viral upper respiratory infections will not respond to antibiotics but with simple symptomatic care, typically run their course in 10-14 days.     - Tylenol or Ibuprofen as directed as needed for fever/pain. Avoid tylenol if you have a history of liver disease. Do not take ibuprofen if you have a history of GI bleeding, kidney disease, or if you take blood thinners.     - You can take over-the-counter claritin, zyrtec, allegra, or xyzal as directed. These are antihistamines that can help with runny nose, nasal congestion, sneezing, and helps to dry up post-nasal drip, which usually causes sore throat and cough.   - If you do NOT have high blood pressure, you may use a decongestant form (D)  of this medication (ie. Claritin- D, zyrtec-D, allegra-D) or if you do not take the D form, you can take sudafed (pseudoephedrine) over the counter, which is a decongestant. Do NOT take two decongestant (D) medications at the same time (such as mucinex-D and claritin-D or plain sudafed and claritin D)    - You can use Flonase (fluticasone) nasal spray as directed for sinus congestion and postnasal drip. This is a steroid nasal spray that works locally over time to decrease the inflammation in your nose/sinuses and help with allergic symptoms. This is not an quick- relief spray like afrin, but it works well if used daily.  Discontinue if you develop nose bleed  - use nasal saline prior to Flonase.  - Use Ocean Spray Nasal Saline 1-3 puffs each nostril every 2-3 hours then blow out onto tissue. This is to irrigate the nasal passage way to clear the sinus openings. Use until sinus problem resolved.    - you can take plain Mucinex (guaifenesin) 1200 mg twice a day to help loosen mucous.     -warm salt water gargles can help with sore throat    - warm tea with honey can help with  cough. Honey is a natural cough suppressant.    - Dextromethorphan (DM) is a cough suppressant over the counter (ie. mucinex DM, robitussin, delsym; dayquil/nyquil has DM as well.)    - Follow up with your PCP or specialty clinic as directed in the next 1-2 weeks if not improved or as needed.  You can call [phone] to schedule an appointment with the appropriate provider.      - Go to the ER if you develop new or worsening symptoms.     - You must understand that you have received an Urgent Care treatment only and that you may be released before all of your medical problems are known or treated.   - You, the patient, will arrange for follow up care as instructed.   - If your condition worsens or fails to improve we recommend that you receive another evaluation at the ER immediately or contact your PCP to discuss your concerns or return here.     Patient Instructions   - You must understand that you have received an Urgent Care treatment only and that you may be released before all of your medical problems are known or treated.   - You, the patient, will arrange for follow up care as instructed.   - If your condition worsens or fails to improve we recommend that you receive another evaluation at the ER immediately or contact your PCP to discuss your concerns or return here.     Advised on return/follow-up precautions. Advised on ER precautions. Answered all patient questions. Patient verbalized understanding and voiced agreement with current treatment plan.

## 2023-03-16 NOTE — PROGRESS NOTES
"Subjective:       Patient ID: Yuan Bains is a 32 y.o. male.    Vitals:  height is 6' 2" (1.88 m) and weight is 79.4 kg (175 lb). His temperature is 98 °F (36.7 °C). His blood pressure is 135/87 and his pulse is 76. His respiration is 17 and oxygen saturation is 98%.     Chief Complaint: Sore Throat    This is a 32 y.o. male who presents today with a chief complaint of   Sore throat for about a week, hoarse voice. 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  Daughter recently dx with strep    Sore Throat   This is a new problem. The current episode started in the past 7 days. The problem has been gradually improving. There has been no fever. The pain is at a severity of 2/10. The pain is mild. Associated symptoms include congestion. Pertinent negatives include no abdominal pain, coughing, diarrhea, ear discharge, ear pain, headaches, neck pain, shortness of breath, trouble swallowing or vomiting. He has had no exposure to strep. He has tried gargles and cool liquids for the symptoms. The treatment provided no relief.     Constitution: Negative for chills, sweating, fatigue and fever.   HENT:  Positive for congestion, postnasal drip and sore throat. Negative for ear pain, ear discharge, sinus pain, sinus pressure, trouble swallowing and voice change.    Neck: Negative for neck pain and painful lymph nodes.   Cardiovascular:  Negative for chest pain, palpitations and sob on exertion.   Respiratory:  Negative for chest tightness, cough, sputum production, shortness of breath and wheezing.    Gastrointestinal:  Negative for abdominal pain, nausea, vomiting and diarrhea.   Musculoskeletal:  Negative for muscle ache.   Skin:  Negative for color change, pale and rash.   Allergic/Immunologic: Negative for sneezing.   Neurological:  Negative for headaches.   Hematologic/Lymphatic: Negative for swollen lymph nodes.     Objective:      Physical Exam   Constitutional: He is oriented to person, place, and time. He appears " well-developed. He is cooperative.  Non-toxic appearance. He does not appear ill. No distress.   HENT:   Head: Normocephalic and atraumatic.   Ears:   Right Ear: Hearing, tympanic membrane, external ear and ear canal normal.   Left Ear: Hearing, tympanic membrane, external ear and ear canal normal.   Nose: Nose normal. No mucosal edema, rhinorrhea, nasal deformity or congestion. No epistaxis. Right sinus exhibits no maxillary sinus tenderness and no frontal sinus tenderness. Left sinus exhibits no maxillary sinus tenderness and no frontal sinus tenderness.   Mouth/Throat: Uvula is midline and mucous membranes are normal. No trismus in the jaw. Normal dentition. No uvula swelling. Posterior oropharyngeal erythema present. No oropharyngeal exudate or posterior oropharyngeal edema.   Eyes: Conjunctivae and lids are normal. No scleral icterus.   Neck: Trachea normal and phonation normal. Neck supple. No edema present. No erythema present. No neck rigidity present.   Cardiovascular: Normal rate, regular rhythm and normal heart sounds.   Pulmonary/Chest: Effort normal and breath sounds normal. No stridor. No respiratory distress. He has no decreased breath sounds. He has no wheezes. He has no rhonchi. He has no rales. He exhibits no tenderness.   Abdominal: Normal appearance.   Musculoskeletal: Normal range of motion.         General: No deformity. Normal range of motion.      Cervical back: He exhibits no tenderness.   Lymphadenopathy:     He has cervical adenopathy.   Neurological: He is alert and oriented to person, place, and time. He exhibits normal muscle tone. Coordination normal.   Skin: Skin is warm, dry, intact, not diaphoretic and not pale.   Psychiatric: His speech is normal and behavior is normal. Judgment and thought content normal.   Nursing note and vitals reviewed.      Results for orders placed or performed in visit on 03/16/23   POCT Strep A, Molecular   Result Value Ref Range    Molecular Strep A, POC  Negative Negative     Acceptable Yes        Assessment:       1. Rhinitis, unspecified type    2. Sore throat    3. Sinusitis, unspecified chronicity, unspecified location          Plan:         Rhinitis, unspecified type  -     fluticasone propionate (FLONASE) 50 mcg/actuation nasal spray; 2 sprays (100 mcg total) by Each Nostril route once daily. for 7 days  Dispense: 15.8 mL; Refill: 2    Sore throat  -     POCT Strep A, Molecular    Sinusitis, unspecified chronicity, unspecified location  -     fluticasone propionate (FLONASE) 50 mcg/actuation nasal spray; 2 sprays (100 mcg total) by Each Nostril route once daily. for 7 days  Dispense: 15.8 mL; Refill: 2                   Patient Instructions   See additional patient Instructions provided    - Rest.    - Drink plenty of fluids.  - Bacterial infections can be treated with oral antibiotics, however, Viral upper respiratory infections will not respond to antibiotics but with simple symptomatic care, typically run their course in 10-14 days.     - Tylenol or Ibuprofen as directed as needed for fever/pain. Avoid tylenol if you have a history of liver disease. Do not take ibuprofen if you have a history of GI bleeding, kidney disease, or if you take blood thinners.     - You can take over-the-counter claritin, zyrtec, allegra, or xyzal as directed. These are antihistamines that can help with runny nose, nasal congestion, sneezing, and helps to dry up post-nasal drip, which usually causes sore throat and cough.   - If you do NOT have high blood pressure, you may use a decongestant form (D)  of this medication (ie. Claritin- D, zyrtec-D, allegra-D) or if you do not take the D form, you can take sudafed (pseudoephedrine) over the counter, which is a decongestant. Do NOT take two decongestant (D) medications at the same time (such as mucinex-D and claritin-D or plain sudafed and claritin D)    - You can use Flonase (fluticasone) nasal spray as directed for  sinus congestion and postnasal drip. This is a steroid nasal spray that works locally over time to decrease the inflammation in your nose/sinuses and help with allergic symptoms. This is not an quick- relief spray like afrin, but it works well if used daily.  Discontinue if you develop nose bleed  - use nasal saline prior to Flonase.  - Use Ocean Spray Nasal Saline 1-3 puffs each nostril every 2-3 hours then blow out onto tissue. This is to irrigate the nasal passage way to clear the sinus openings. Use until sinus problem resolved.    - you can take plain Mucinex (guaifenesin) 1200 mg twice a day to help loosen mucous.     -warm salt water gargles can help with sore throat    - warm tea with honey can help with cough. Honey is a natural cough suppressant.    - Dextromethorphan (DM) is a cough suppressant over the counter (ie. mucinex DM, robitussin, delsym; dayquil/nyquil has DM as well.)    - Follow up with your PCP or specialty clinic as directed in the next 1-2 weeks if not improved or as needed.  You can call [phone] to schedule an appointment with the appropriate provider.      - Go to the ER if you develop new or worsening symptoms.     - You must understand that you have received an Urgent Care treatment only and that you may be released before all of your medical problems are known or treated.   - You, the patient, will arrange for follow up care as instructed.   - If your condition worsens or fails to improve we recommend that you receive another evaluation at the ER immediately or contact your PCP to discuss your concerns or return here.     Patient Instructions   - You must understand that you have received an Urgent Care treatment only and that you may be released before all of your medical problems are known or treated.   - You, the patient, will arrange for follow up care as instructed.   - If your condition worsens or fails to improve we recommend that you receive another evaluation at the ER  immediately or contact your PCP to discuss your concerns or return here.     Advised on return/follow-up precautions. Advised on ER precautions. Answered all patient questions. Patient verbalized understanding and voiced agreement with current treatment plan.

## 2023-07-05 ENCOUNTER — OFFICE VISIT (OUTPATIENT)
Dept: PRIMARY CARE CLINIC | Facility: CLINIC | Age: 33
End: 2023-07-05
Payer: COMMERCIAL

## 2023-07-05 ENCOUNTER — LAB VISIT (OUTPATIENT)
Dept: LAB | Facility: HOSPITAL | Age: 33
End: 2023-07-05
Attending: STUDENT IN AN ORGANIZED HEALTH CARE EDUCATION/TRAINING PROGRAM
Payer: COMMERCIAL

## 2023-07-05 VITALS
OXYGEN SATURATION: 97 % | HEIGHT: 73 IN | WEIGHT: 179.69 LBS | BODY MASS INDEX: 23.82 KG/M2 | SYSTOLIC BLOOD PRESSURE: 128 MMHG | DIASTOLIC BLOOD PRESSURE: 68 MMHG | TEMPERATURE: 98 F | HEART RATE: 61 BPM

## 2023-07-05 DIAGNOSIS — Z13.1 SCREENING FOR DIABETES MELLITUS: ICD-10-CM

## 2023-07-05 DIAGNOSIS — Z01.89 PATIENT REQUEST FOR DIAGNOSTIC TESTING: ICD-10-CM

## 2023-07-05 DIAGNOSIS — Z00.00 ENCOUNTER FOR PREVENTATIVE ADULT HEALTH CARE EXAMINATION: ICD-10-CM

## 2023-07-05 DIAGNOSIS — R53.83 FATIGUE, UNSPECIFIED TYPE: ICD-10-CM

## 2023-07-05 DIAGNOSIS — Z13.39 ADHD (ATTENTION DEFICIT HYPERACTIVITY DISORDER) EVALUATION: Primary | ICD-10-CM

## 2023-07-05 DIAGNOSIS — Z13.220 SCREENING FOR HYPERLIPIDEMIA: ICD-10-CM

## 2023-07-05 DIAGNOSIS — R20.2 PARESTHESIA OF RIGHT LEG: ICD-10-CM

## 2023-07-05 DIAGNOSIS — F90.9 ATTENTION DEFICIT HYPERACTIVITY DISORDER (ADHD), UNSPECIFIED ADHD TYPE: ICD-10-CM

## 2023-07-05 LAB
ALBUMIN SERPL BCP-MCNC: 4 G/DL (ref 3.5–5.2)
ALP SERPL-CCNC: 101 U/L (ref 55–135)
ALT SERPL W/O P-5'-P-CCNC: 29 U/L (ref 10–44)
ANION GAP SERPL CALC-SCNC: 7 MMOL/L (ref 8–16)
AST SERPL-CCNC: 24 U/L (ref 10–40)
BILIRUB SERPL-MCNC: 0.3 MG/DL (ref 0.1–1)
BUN SERPL-MCNC: 15 MG/DL (ref 6–20)
CALCIUM SERPL-MCNC: 9.3 MG/DL (ref 8.7–10.5)
CHLORIDE SERPL-SCNC: 105 MMOL/L (ref 95–110)
CHOLEST SERPL-MCNC: 136 MG/DL (ref 120–199)
CHOLEST/HDLC SERPL: 2.9 {RATIO} (ref 2–5)
CO2 SERPL-SCNC: 30 MMOL/L (ref 23–29)
CREAT SERPL-MCNC: 1.1 MG/DL (ref 0.5–1.4)
ERYTHROCYTE [DISTWIDTH] IN BLOOD BY AUTOMATED COUNT: 12.2 % (ref 11.5–14.5)
EST. GFR  (NO RACE VARIABLE): >60 ML/MIN/1.73 M^2
ESTIMATED AVG GLUCOSE: 91 MG/DL (ref 68–131)
GLUCOSE SERPL-MCNC: 91 MG/DL (ref 70–110)
HBA1C MFR BLD: 4.8 % (ref 4–5.6)
HCT VFR BLD AUTO: 44 % (ref 40–54)
HDLC SERPL-MCNC: 47 MG/DL (ref 40–75)
HDLC SERPL: 34.6 % (ref 20–50)
HGB BLD-MCNC: 14.4 G/DL (ref 14–18)
LDLC SERPL CALC-MCNC: 79.8 MG/DL (ref 63–159)
MCH RBC QN AUTO: 28.9 PG (ref 27–31)
MCHC RBC AUTO-ENTMCNC: 32.7 G/DL (ref 32–36)
MCV RBC AUTO: 88 FL (ref 82–98)
NONHDLC SERPL-MCNC: 89 MG/DL
PLATELET # BLD AUTO: 296 K/UL (ref 150–450)
PMV BLD AUTO: 9.4 FL (ref 9.2–12.9)
POTASSIUM SERPL-SCNC: 4.3 MMOL/L (ref 3.5–5.1)
PROT SERPL-MCNC: 7 G/DL (ref 6–8.4)
RBC # BLD AUTO: 4.98 M/UL (ref 4.6–6.2)
SODIUM SERPL-SCNC: 142 MMOL/L (ref 136–145)
TESTOST SERPL-MCNC: 1037 NG/DL (ref 304–1227)
TRIGL SERPL-MCNC: 46 MG/DL (ref 30–150)
WBC # BLD AUTO: 5.99 K/UL (ref 3.9–12.7)

## 2023-07-05 PROCEDURE — 85027 COMPLETE CBC AUTOMATED: CPT | Performed by: STUDENT IN AN ORGANIZED HEALTH CARE EDUCATION/TRAINING PROGRAM

## 2023-07-05 PROCEDURE — 84403 ASSAY OF TOTAL TESTOSTERONE: CPT | Performed by: STUDENT IN AN ORGANIZED HEALTH CARE EDUCATION/TRAINING PROGRAM

## 2023-07-05 PROCEDURE — 83036 HEMOGLOBIN GLYCOSYLATED A1C: CPT | Performed by: STUDENT IN AN ORGANIZED HEALTH CARE EDUCATION/TRAINING PROGRAM

## 2023-07-05 PROCEDURE — 3008F BODY MASS INDEX DOCD: CPT | Mod: CPTII,S$GLB,, | Performed by: STUDENT IN AN ORGANIZED HEALTH CARE EDUCATION/TRAINING PROGRAM

## 2023-07-05 PROCEDURE — 3078F DIAST BP <80 MM HG: CPT | Mod: CPTII,S$GLB,, | Performed by: STUDENT IN AN ORGANIZED HEALTH CARE EDUCATION/TRAINING PROGRAM

## 2023-07-05 PROCEDURE — 99999 PR PBB SHADOW E&M-EST. PATIENT-LVL V: CPT | Mod: PBBFAC,,, | Performed by: STUDENT IN AN ORGANIZED HEALTH CARE EDUCATION/TRAINING PROGRAM

## 2023-07-05 PROCEDURE — 3074F SYST BP LT 130 MM HG: CPT | Mod: CPTII,S$GLB,, | Performed by: STUDENT IN AN ORGANIZED HEALTH CARE EDUCATION/TRAINING PROGRAM

## 2023-07-05 PROCEDURE — 36415 COLL VENOUS BLD VENIPUNCTURE: CPT | Mod: PN | Performed by: STUDENT IN AN ORGANIZED HEALTH CARE EDUCATION/TRAINING PROGRAM

## 2023-07-05 PROCEDURE — 3008F PR BODY MASS INDEX (BMI) DOCUMENTED: ICD-10-PCS | Mod: CPTII,S$GLB,, | Performed by: STUDENT IN AN ORGANIZED HEALTH CARE EDUCATION/TRAINING PROGRAM

## 2023-07-05 PROCEDURE — 80061 LIPID PANEL: CPT | Performed by: STUDENT IN AN ORGANIZED HEALTH CARE EDUCATION/TRAINING PROGRAM

## 2023-07-05 PROCEDURE — 99395 PR PREVENTIVE VISIT,EST,18-39: ICD-10-PCS | Mod: S$GLB,,, | Performed by: STUDENT IN AN ORGANIZED HEALTH CARE EDUCATION/TRAINING PROGRAM

## 2023-07-05 PROCEDURE — 99999 PR PBB SHADOW E&M-EST. PATIENT-LVL V: ICD-10-PCS | Mod: PBBFAC,,, | Performed by: STUDENT IN AN ORGANIZED HEALTH CARE EDUCATION/TRAINING PROGRAM

## 2023-07-05 PROCEDURE — 3074F PR MOST RECENT SYSTOLIC BLOOD PRESSURE < 130 MM HG: ICD-10-PCS | Mod: CPTII,S$GLB,, | Performed by: STUDENT IN AN ORGANIZED HEALTH CARE EDUCATION/TRAINING PROGRAM

## 2023-07-05 PROCEDURE — 1159F PR MEDICATION LIST DOCUMENTED IN MEDICAL RECORD: ICD-10-PCS | Mod: CPTII,S$GLB,, | Performed by: STUDENT IN AN ORGANIZED HEALTH CARE EDUCATION/TRAINING PROGRAM

## 2023-07-05 PROCEDURE — 80053 COMPREHEN METABOLIC PANEL: CPT | Performed by: STUDENT IN AN ORGANIZED HEALTH CARE EDUCATION/TRAINING PROGRAM

## 2023-07-05 PROCEDURE — 3078F PR MOST RECENT DIASTOLIC BLOOD PRESSURE < 80 MM HG: ICD-10-PCS | Mod: CPTII,S$GLB,, | Performed by: STUDENT IN AN ORGANIZED HEALTH CARE EDUCATION/TRAINING PROGRAM

## 2023-07-05 PROCEDURE — 1160F RVW MEDS BY RX/DR IN RCRD: CPT | Mod: CPTII,S$GLB,, | Performed by: STUDENT IN AN ORGANIZED HEALTH CARE EDUCATION/TRAINING PROGRAM

## 2023-07-05 PROCEDURE — 99395 PREV VISIT EST AGE 18-39: CPT | Mod: S$GLB,,, | Performed by: STUDENT IN AN ORGANIZED HEALTH CARE EDUCATION/TRAINING PROGRAM

## 2023-07-05 PROCEDURE — 1159F MED LIST DOCD IN RCRD: CPT | Mod: CPTII,S$GLB,, | Performed by: STUDENT IN AN ORGANIZED HEALTH CARE EDUCATION/TRAINING PROGRAM

## 2023-07-05 PROCEDURE — 1160F PR REVIEW ALL MEDS BY PRESCRIBER/CLIN PHARMACIST DOCUMENTED: ICD-10-PCS | Mod: CPTII,S$GLB,, | Performed by: STUDENT IN AN ORGANIZED HEALTH CARE EDUCATION/TRAINING PROGRAM

## 2023-07-05 NOTE — PATIENT INSTRUCTIONS
For Psychiatry appointments, please contact Department for scheduling options:    [facility] [phone]  [facility] [phone]

## 2023-07-05 NOTE — PROGRESS NOTES
"Primary Care  Return/Acute Office Visit - In Person  7/5/2023  Mwengwe Ndhlovu      \[facility] [place] [facility]\""    Patient is a 33 y.o.   Yuan Bains  has no past medical history on file.    Patient presents with   Chief Complaint   Patient presents with    Establish Care     Patient presenting to establish care     He expressed concern about low energy/motivation which he suspects is related to a variety of psychosocial stressors.     He is concerned about hormone and vitamin deficiencies     Patient also reports intermittent sensation of numbness in right thigh which he was told is related to nerve entrapment       Social History     Social History Narrative    Not on file     Yuan Bains family history includes Breast cancer in his maternal aunt and mother; Skin cancer in his mother.    Active Medications:  Review of patient's allergies indicates:  No Known Allergies  Current Outpatient Medications   Medication Instructions    dextroamphetamine sulfate (DEXEDRINE SPANSULE) 15 mg, Oral, Every morning       Review of Systems   All other systems reviewed and are negative.    Vitals:    07/05/23 0845   BP: 128/68   BP Location: Left arm   Pulse: 61   Temp: 98 °F (36.7 °C)   SpO2: 97%   Weight: 81.5 kg (179 lb 10.8 oz)   Height: 6' 1" (1.854 m)       Physical Exam  Vitals reviewed.   Constitutional:       General: He is not in acute distress.  Cardiovascular:      Rate and Rhythm: Normal rate and regular rhythm.      Pulses: Normal pulses.      Heart sounds: Normal heart sounds.   Pulmonary:      Effort: Pulmonary effort is normal.      Breath sounds: Normal breath sounds.   Abdominal:      General: Abdomen is flat. Bowel sounds are normal.      Palpations: Abdomen is soft.   Musculoskeletal:         General: No swelling.      Right lower leg: No edema.      Left lower leg: No edema.   Neurological:      General: No focal deficit present.      Mental Status: He is oriented to person, place, and time.        Assessment and Plan     Screening HLD "   Lipid panel     Screening DM   HbA1C    Routine Labs   CMP    Fatigue   Testosterone at patient request   F/u cbc     RLE paraesthesias   Referral placed to orthopedics       Orders Placed This Visit  Orders Placed This Encounter   Procedures    Lipid Panel     Standing Status:   Future     Number of Occurrences:   1     Standing Expiration Date:   9/2/2024    Hemoglobin A1C     Standing Status:   Future     Number of Occurrences:   1     Standing Expiration Date:   9/2/2024    CBC Without Differential     Standing Status:   Future     Number of Occurrences:   1     Standing Expiration Date:   9/2/2024    TESTOSTERONE     Standing Status:   Future     Number of Occurrences:   1     Standing Expiration Date:   9/2/2024    COMPREHENSIVE METABOLIC PANEL     Standing Status:   Future     Number of Occurrences:   1     Standing Expiration Date:   9/2/2024    Ambulatory referral/consult to Psychiatry     Standing Status:   Future     Standing Expiration Date:   8/5/2024     Referral Priority:   Routine     Referral Type:   Psychiatric     Referral Reason:   Specialty Services Required     Requested Specialty:   Psychiatry     Number of Visits Requested:   1    Ambulatory referral/consult to Orthopedics     Standing Status:   Future     Standing Expiration Date:   8/5/2024     Referral Priority:   Routine     Referral Type:   Consultation     Requested Specialty:   Orthopedic Surgery     Number of Visits Requested:   1           Upcoming Scheduled Appointments and Follow Up:    No future appointments.    Follow Up [facility] (with who? when?): Follow up in about 6 months (around 1/5/2024).      Extended Emergency Contact Information  Primary Emergency Contact: ELIO CALDERÓN  Mobile Phone: [phone]  Relation: Spouse      Wilfrido Newman MD   Attending Physician  Primary Care  7/5/2023 - 8:52 AM    I spent a total of 22 minutes on the day of the visit.This includes face to face time and non-face to face time  preparing to see the patient (eg, review of tests), obtaining and/or reviewing separately obtained history, documenting clinical information in the electronic or other health record, independently interpreting results and communicating results to the patient/family/caregiver, or care coordinator.

## 2023-07-12 ENCOUNTER — TELEPHONE (OUTPATIENT)
Dept: PRIMARY CARE CLINIC | Facility: CLINIC | Age: 33
End: 2023-07-12
Payer: COMMERCIAL

## 2023-07-12 DIAGNOSIS — Z13.39 ADHD (ATTENTION DEFICIT HYPERACTIVITY DISORDER) EVALUATION: Primary | ICD-10-CM

## 2023-08-02 ENCOUNTER — CLINICAL SUPPORT (OUTPATIENT)
Dept: PRIMARY CARE CLINIC | Facility: CLINIC | Age: 33
End: 2023-08-02
Payer: COMMERCIAL

## 2023-08-02 DIAGNOSIS — Z92.89 HISTORY OF EKG: Primary | ICD-10-CM

## 2023-08-02 DIAGNOSIS — Z13.39 ADHD (ATTENTION DEFICIT HYPERACTIVITY DISORDER) EVALUATION: ICD-10-CM

## 2023-08-02 PROCEDURE — 93005 ELECTROCARDIOGRAM TRACING: CPT | Mod: S$GLB,,, | Performed by: STUDENT IN AN ORGANIZED HEALTH CARE EDUCATION/TRAINING PROGRAM

## 2023-08-02 PROCEDURE — 93010 ELECTROCARDIOGRAM REPORT: CPT | Mod: S$GLB,,, | Performed by: INTERNAL MEDICINE

## 2023-08-02 PROCEDURE — 93010 EKG 12-LEAD: ICD-10-PCS | Mod: S$GLB,,, | Performed by: INTERNAL MEDICINE

## 2023-08-02 PROCEDURE — 93005 EKG 12-LEAD: ICD-10-PCS | Mod: S$GLB,,, | Performed by: STUDENT IN AN ORGANIZED HEALTH CARE EDUCATION/TRAINING PROGRAM

## 2023-08-02 NOTE — PROGRESS NOTES
Pt presents today for Ekg testing. This was recommended from his Psychiatrist at [facility] for dx, ADHD for water sport. No complaints while present. Ekg faxed to Psychiatrist.

## 2023-08-09 ENCOUNTER — TELEPHONE (OUTPATIENT)
Dept: PRIMARY CARE CLINIC | Facility: CLINIC | Age: 33
End: 2023-08-09
Payer: COMMERCIAL

## 2023-08-09 NOTE — TELEPHONE ENCOUNTER
----- Message from Lisette Costa sent at 8/9/2023  3:30 PM CDT -----  Contact: Dr Martinez [phone]  Dr Alda Martinez is requesting to speak to you about this mutual patient.    Patients ECG came back bord line and she is wanting to prescribe a medication and would like your thoughts on it.    Please call and advise.    Thank You

## 2023-08-10 ENCOUNTER — PATIENT MESSAGE (OUTPATIENT)
Dept: PRIMARY CARE CLINIC | Facility: CLINIC | Age: 33
End: 2023-08-10
Payer: COMMERCIAL

## 2023-08-11 ENCOUNTER — TELEPHONE (OUTPATIENT)
Dept: PRIMARY CARE CLINIC | Facility: CLINIC | Age: 33
End: 2023-08-11
Payer: COMMERCIAL

## 2023-08-11 ENCOUNTER — TELEPHONE (OUTPATIENT)
Dept: SPORTS MEDICINE | Facility: CLINIC | Age: 33
End: 2023-08-11
Payer: COMMERCIAL

## 2023-08-11 DIAGNOSIS — Z13.39 ADHD (ATTENTION DEFICIT HYPERACTIVITY DISORDER) EVALUATION: ICD-10-CM

## 2023-08-11 DIAGNOSIS — F90.9 ATTENTION DEFICIT HYPERACTIVITY DISORDER (ADHD), UNSPECIFIED ADHD TYPE: ICD-10-CM

## 2023-08-11 DIAGNOSIS — I45.10 INCOMPLETE RIGHT BUNDLE BRANCH BLOCK (RBBB): Primary | ICD-10-CM

## 2023-08-11 NOTE — TELEPHONE ENCOUNTER
Lov 7/5/23  Dr Alda Link [phone] contacted the office regarding patients. She shares the patient with you and was trying to find out if it was ok for pt to take a stimulant for ADD. I informed that your msg to patient adv him to avoid stimulants. Patient would like a referral to cardiology for potential work up and to see if they will clear him to take stimulants.  says she will be comfortable writing it if it is ok with a cardiologist.

## 2023-08-11 NOTE — TELEPHONE ENCOUNTER
----- Message from Lisette Costa sent at 8/11/2023 12:34 PM CDT -----  Contact: Dr Alda Link [phone]  Requesting a call back (2 request)  Patient had a irregular ECG and needs to speak to you before prescribing medication for the patient.    If she is not available please leave message/confident-secure recording.    Please call and advise.    Thank You

## 2023-08-11 NOTE — TELEPHONE ENCOUNTER
Called and spoke with patient to let him know that he was schedule incorrectly and sent him to the main campus for spine.

## 2023-08-17 DIAGNOSIS — G89.29 CHRONIC RIGHT HIP PAIN: Primary | ICD-10-CM

## 2023-08-17 DIAGNOSIS — M25.551 CHRONIC RIGHT HIP PAIN: Primary | ICD-10-CM

## 2023-08-30 ENCOUNTER — HOSPITAL ENCOUNTER (OUTPATIENT)
Dept: RADIOLOGY | Facility: HOSPITAL | Age: 33
Discharge: HOME | End: 2023-08-30
Attending: ORTHOPAEDIC SURGERY
Payer: COMMERCIAL

## 2023-08-30 ENCOUNTER — OFFICE VISIT (OUTPATIENT)
Dept: ORTHOPEDICS | Facility: CLINIC | Age: 33
End: 2023-08-30
Payer: COMMERCIAL

## 2023-08-30 VITALS
HEIGHT: 73 IN | BODY MASS INDEX: 25.33 KG/M2 | SYSTOLIC BLOOD PRESSURE: 129 MMHG | WEIGHT: 191.13 LBS | DIASTOLIC BLOOD PRESSURE: 85 MMHG | HEART RATE: 81 BPM

## 2023-08-30 DIAGNOSIS — G89.29 CHRONIC RIGHT HIP PAIN: ICD-10-CM

## 2023-08-30 DIAGNOSIS — R20.2 PARESTHESIA OF RIGHT LEG: ICD-10-CM

## 2023-08-30 DIAGNOSIS — M25.551 CHRONIC RIGHT HIP PAIN: ICD-10-CM

## 2023-08-30 PROCEDURE — 3044F HG A1C LEVEL LT 7.0%: CPT | Mod: CPTII,S$GLB,, | Performed by: ORTHOPAEDIC SURGERY

## 2023-08-30 PROCEDURE — 73502 X-RAY EXAM HIP UNI 2-3 VIEWS: CPT | Mod: TC,PN,RT

## 2023-08-30 PROCEDURE — 99999 PR PBB SHADOW E&M-EST. PATIENT-LVL III: CPT | Mod: PBBFAC,,, | Performed by: ORTHOPAEDIC SURGERY

## 2023-08-30 PROCEDURE — 73502 XR HIP WITH PELVIS WHEN PERFORMED, 2 OR 3  VIEWS RIGHT: ICD-10-PCS | Mod: 26,RT,, | Performed by: STUDENT IN AN ORGANIZED HEALTH CARE EDUCATION/TRAINING PROGRAM

## 2023-08-30 PROCEDURE — 99214 PR OFFICE/OUTPT VISIT, EST, LEVL IV, 30-39 MIN: ICD-10-PCS | Mod: S$GLB,,, | Performed by: ORTHOPAEDIC SURGERY

## 2023-08-30 PROCEDURE — 3008F BODY MASS INDEX DOCD: CPT | Mod: CPTII,S$GLB,, | Performed by: ORTHOPAEDIC SURGERY

## 2023-08-30 PROCEDURE — 99999 PR PBB SHADOW E&M-EST. PATIENT-LVL III: ICD-10-PCS | Mod: PBBFAC,,, | Performed by: ORTHOPAEDIC SURGERY

## 2023-08-30 PROCEDURE — 3079F DIAST BP 80-89 MM HG: CPT | Mod: CPTII,S$GLB,, | Performed by: ORTHOPAEDIC SURGERY

## 2023-08-30 PROCEDURE — 3044F PR MOST RECENT HEMOGLOBIN A1C LEVEL <7.0%: ICD-10-PCS | Mod: CPTII,S$GLB,, | Performed by: ORTHOPAEDIC SURGERY

## 2023-08-30 PROCEDURE — 73502 X-RAY EXAM HIP UNI 2-3 VIEWS: CPT | Mod: 26,RT,, | Performed by: STUDENT IN AN ORGANIZED HEALTH CARE EDUCATION/TRAINING PROGRAM

## 2023-08-30 PROCEDURE — 3074F PR MOST RECENT SYSTOLIC BLOOD PRESSURE < 130 MM HG: ICD-10-PCS | Mod: CPTII,S$GLB,, | Performed by: ORTHOPAEDIC SURGERY

## 2023-08-30 PROCEDURE — 3074F SYST BP LT 130 MM HG: CPT | Mod: CPTII,S$GLB,, | Performed by: ORTHOPAEDIC SURGERY

## 2023-08-30 PROCEDURE — 3079F PR MOST RECENT DIASTOLIC BLOOD PRESSURE 80-89 MM HG: ICD-10-PCS | Mod: CPTII,S$GLB,, | Performed by: ORTHOPAEDIC SURGERY

## 2023-08-30 PROCEDURE — 3008F PR BODY MASS INDEX (BMI) DOCUMENTED: ICD-10-PCS | Mod: CPTII,S$GLB,, | Performed by: ORTHOPAEDIC SURGERY

## 2023-08-30 PROCEDURE — 99214 OFFICE O/P EST MOD 30 MIN: CPT | Mod: S$GLB,,, | Performed by: ORTHOPAEDIC SURGERY

## 2023-08-30 RX ORDER — DEXTROAMPHETAMINE SACCHARATE, AMPHETAMINE ASPARTATE, DEXTROAMPHETAMINE SULFATE AND AMPHETAMINE SULFATE 3.75; 3.75; 3.75; 3.75 MG/1; MG/1; MG/1; MG/1
15 TABLET ORAL EVERY MORNING
COMMUNITY
Start: 2023-07-14

## 2023-08-30 NOTE — PROGRESS NOTES
"Patient ID:   Yuan Bains is a 33 y.o. male.    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 He has a history of right hip pain and was previously diagnosed with a labral tear. He also has a history of being diagnosed with lumbar degenerative disc disease. Both conditions have been managed in the past with physical therapy. Three months ago, he became increasingly worried because of paresthesias in the right thigh. The hip also became more painful. He rates his pain today 7/10. He denies wearing any tight clothing or objects around the waist area.     Medications:    Current Outpatient Medications:     dextroamphetamine sulfate (DEXEDRINE SPANSULE) 15 MG 24 hr capsule, Take 15 mg by mouth every morning., Disp: , Rfl:     dextroamphetamine-amphetamine (ADDERALL) 15 mg tablet, Take 15 mg by mouth every morning., Disp: , Rfl:     Allergies:  Review of patient's allergies indicates:  No Known Allergies    Past Medical History:  History reviewed. No pertinent past medical history.     Past Surgical History:  History reviewed. No pertinent surgical history.    Social History:  Social History     Occupational History    Not on file   Tobacco Use    Smoking status: Never    Smokeless tobacco: Never   Substance and Sexual Activity    Alcohol use: Not on file    Drug use: Not on file    Sexual activity: Not on file       Family History:  Family History   Problem Relation Age of Onset    Skin cancer Mother     Breast cancer Mother     Breast cancer Maternal Aunt         ROS:  Review of Systems   Musculoskeletal:  Positive for joint pain and myalgias.   Neurological:  Positive for numbness and paresthesias.       Vitals:  /85   Pulse 81   Ht 6' 1" (1.854 m)   Wt 86.7 kg (191 lb 2.2 oz)   BMI 25.22 kg/m²     Physical Examination:  Comprehensive Orthopaedic Musculoskeletal Exam    General      Constitutional: appears stated age, well-developed and " well-nourished    Scleral icterus: no    Labored breathing: no    Psychiatric: normal mood and affect and no acute distress    Neurological: alert and oriented x3    Skin: intact    Lymphadenopathy: none     Ortho Exam   Lumbar exam:  Negative bilateral SLR    Right hip exam:  No LLD  Negative Stinchfield.   Negative FADDIR but does have limited internal rotation bilaterally.   Decreased sensation over the right anterolateral thigh.   Quad strength 5/5 - no visible or measurable significant atrophy    Imaging:  I have ordered and independently reviewed the following imaging studies performed at [facility] today    X-Ray Hip 2 or 3 views Right (with Pelvis when performed)  Narrative: EXAMINATION:  XR HIP WITH PELVIS WHEN PERFORMED, 2 OR 3  VIEWS RIGHT    CLINICAL HISTORY:  Pain in right hip    TECHNIQUE:  AP pelvis and frog leg lateral view of the right hip were performed.    COMPARISON:  None    FINDINGS:  No fracture or dislocation.    Hip cartilage space is maintained.    No suspicious appearing lytic or blastic lesions.  Impression: Please see above.    Electronically signed by: Jaime Arellano  Date:    08/30/2023  Time:    14:09    Assessment:  1. Paresthesia of right leg      Pln:  Findings are consistent with meralgia paresthetica likely secondary to repetitive hip flexion movements. He will observe for now. He will return as needed.      No follow-ups on file.

## 2023-08-31 ENCOUNTER — OFFICE VISIT (OUTPATIENT)
Dept: CARDIOLOGY | Facility: CLINIC | Age: 33
End: 2023-08-31
Payer: COMMERCIAL

## 2023-08-31 VITALS
BODY MASS INDEX: 23.79 KG/M2 | HEART RATE: 73 BPM | WEIGHT: 180.31 LBS | SYSTOLIC BLOOD PRESSURE: 135 MMHG | DIASTOLIC BLOOD PRESSURE: 90 MMHG | OXYGEN SATURATION: 97 %

## 2023-08-31 DIAGNOSIS — I45.10 INCOMPLETE RIGHT BUNDLE BRANCH BLOCK (RBBB): ICD-10-CM

## 2023-08-31 DIAGNOSIS — F90.9 ATTENTION DEFICIT HYPERACTIVITY DISORDER (ADHD), UNSPECIFIED ADHD TYPE: ICD-10-CM

## 2023-08-31 PROCEDURE — 3044F PR MOST RECENT HEMOGLOBIN A1C LEVEL <7.0%: ICD-10-PCS | Mod: CPTII,S$GLB,, | Performed by: INTERNAL MEDICINE

## 2023-08-31 PROCEDURE — 3075F SYST BP GE 130 - 139MM HG: CPT | Mod: CPTII,S$GLB,, | Performed by: INTERNAL MEDICINE

## 2023-08-31 PROCEDURE — 99999 PR PBB SHADOW E&M-EST. PATIENT-LVL III: ICD-10-PCS | Mod: PBBFAC,,, | Performed by: INTERNAL MEDICINE

## 2023-08-31 PROCEDURE — 3075F PR MOST RECENT SYSTOLIC BLOOD PRESS GE 130-139MM HG: ICD-10-PCS | Mod: CPTII,S$GLB,, | Performed by: INTERNAL MEDICINE

## 2023-08-31 PROCEDURE — 3008F PR BODY MASS INDEX (BMI) DOCUMENTED: ICD-10-PCS | Mod: CPTII,S$GLB,, | Performed by: INTERNAL MEDICINE

## 2023-08-31 PROCEDURE — 99204 OFFICE O/P NEW MOD 45 MIN: CPT | Mod: S$GLB,,, | Performed by: INTERNAL MEDICINE

## 2023-08-31 PROCEDURE — 3008F BODY MASS INDEX DOCD: CPT | Mod: CPTII,S$GLB,, | Performed by: INTERNAL MEDICINE

## 2023-08-31 PROCEDURE — 1159F MED LIST DOCD IN RCRD: CPT | Mod: CPTII,S$GLB,, | Performed by: INTERNAL MEDICINE

## 2023-08-31 PROCEDURE — 3080F DIAST BP >= 90 MM HG: CPT | Mod: CPTII,S$GLB,, | Performed by: INTERNAL MEDICINE

## 2023-08-31 PROCEDURE — 99204 PR OFFICE/OUTPT VISIT, NEW, LEVL IV, 45-59 MIN: ICD-10-PCS | Mod: S$GLB,,, | Performed by: INTERNAL MEDICINE

## 2023-08-31 PROCEDURE — 3080F PR MOST RECENT DIASTOLIC BLOOD PRESSURE >= 90 MM HG: ICD-10-PCS | Mod: CPTII,S$GLB,, | Performed by: INTERNAL MEDICINE

## 2023-08-31 PROCEDURE — 3044F HG A1C LEVEL LT 7.0%: CPT | Mod: CPTII,S$GLB,, | Performed by: INTERNAL MEDICINE

## 2023-08-31 PROCEDURE — 1159F PR MEDICATION LIST DOCUMENTED IN MEDICAL RECORD: ICD-10-PCS | Mod: CPTII,S$GLB,, | Performed by: INTERNAL MEDICINE

## 2023-08-31 PROCEDURE — 99999 PR PBB SHADOW E&M-EST. PATIENT-LVL III: CPT | Mod: PBBFAC,,, | Performed by: INTERNAL MEDICINE

## 2023-08-31 RX ORDER — MULTIVITAMIN
1 TABLET ORAL DAILY
COMMUNITY

## 2023-08-31 NOTE — PROGRESS NOTES
Subjective:   @Patient ID:  Yuan Bains is a 33 y.o. male who presents for evaluation of abnormal EKG     HPI:   2023: Initial visit. Abnormal EKG.  EKG  showed incomplete RBBB. He is completely asymptomatic. He is athletic, long distance jumper. No chest pain , or palpitations. No FH of SCD. No syncope or pre syncope.   No tobacco abuse    ADHD: off medications     Prior cardiovascular  Hx  --------------------------------    - EKG 2023: SR, incomplete RBBB          Patient Active Problem List    Diagnosis Date Noted    Chronic left shoulder pain 2020    Chronic right hip pain 10/05/2020           Right Arm BP - Sittin/89  Left Arm BP - Sittin/90        LAST HbA1c  Lab Results   Component Value Date    HGBA1C 4.8 2023       Lipid panel  Lab Results   Component Value Date    CHOL 136 2023     Lab Results   Component Value Date    HDL 47 2023     Lab Results   Component Value Date    LDLCALC 79.8 2023     Lab Results   Component Value Date    TRIG 46 2023     Lab Results   Component Value Date    CHOLHDL 34.6 2023            Review of Systems   Constitutional: Negative for chills and fever.   HENT:  Negative for hearing loss and nosebleeds.    Eyes:  Negative for blurred vision.   Cardiovascular:  Negative for chest pain, leg swelling and palpitations.   Respiratory:  Negative for hemoptysis and shortness of breath.    Hematologic/Lymphatic: Negative for bleeding problem.   Skin:  Negative for itching.   Musculoskeletal:  Negative for falls.   Gastrointestinal:  Negative for abdominal pain and hematochezia.   Genitourinary:  Negative for hematuria.   Neurological:  Negative for dizziness and loss of balance.   Psychiatric/Behavioral:  Negative for altered mental status and depression.        Objective:   Physical Exam  Constitutional:       Appearance: He is well-developed.   HENT:      Head: Normocephalic and atraumatic.   Eyes:      Conjunctiva/sclera:  Conjunctivae normal.   Neck:      Vascular: No carotid bruit or JVD.   Cardiovascular:      Rate and Rhythm: Normal rate and regular rhythm.      Pulses:           Carotid pulses are 2+ on the right side and 2+ on the left side.       Radial pulses are 2+ on the right side and 2+ on the left side.      Heart sounds: Normal heart sounds. No murmur heard.     No friction rub. No gallop.   Pulmonary:      Effort: Pulmonary effort is normal. No respiratory distress.      Breath sounds: Normal breath sounds. No stridor. No wheezing.   Musculoskeletal:      Cervical back: Neck supple.   Skin:     General: Skin is warm and dry.   Neurological:      Mental Status: He is alert and oriented to person, place, and time.   Psychiatric:         Behavior: Behavior normal.         Assessment:     1. Incomplete right bundle branch block (RBBB)    2. Attention deficit hyperactivity disorder (ADHD), unspecified ADHD type        Plan:   - Reassurance.   - No concerning EKG changes  - No high risk symptoms   - Check stress EKG for risk stratification. Given he is athletic.   - Echo to rule out any structural abnormalities   - Monitor bp at home   - Goal bp < 130/80            Pertinent cardiac images and EKG reviewed independently.    Continue with current medical plan and lifestyle changes.  Return sooner for concerns or questions. If symptoms persist go to the ED  I have reviewed all pertinent data including patient's medical history in detail and updated the computerized patient record.     Orders Placed This Encounter   Procedures    Exercise Stress - EKG     Standing Status:   Future     Standing Expiration Date:   8/31/2024     Order Specific Question:   Release to patient     Answer:   Immediate    Echo     Standing Status:   Future     Standing Expiration Date:   8/31/2024     Order Specific Question:   Release to patient     Answer:   Immediate       Follow up as scheduled.     He expressed verbal understanding and agreed with  the plan    Patient's Medications   New Prescriptions    No medications on file   Previous Medications    CREATINE MONOHYDRATE (CREATINE ORAL)    Take by mouth.    DEXTROAMPHETAMINE SULFATE (DEXEDRINE SPANSULE) 15 MG 24 HR CAPSULE    Take 15 mg by mouth every morning.    DEXTROAMPHETAMINE-AMPHETAMINE (ADDERALL) 15 MG TABLET    Take 15 mg by mouth every morning.    MULTIVITAMIN (ONE DAILY MULTIVITAMIN) PER TABLET    Take 1 tablet by mouth once daily.   Modified Medications    No medications on file   Discontinued Medications    No medications on file

## 2023-09-08 ENCOUNTER — HOSPITAL ENCOUNTER (OUTPATIENT)
Dept: CARDIOLOGY | Facility: HOSPITAL | Age: 33
Discharge: HOME | End: 2023-09-08
Attending: INTERNAL MEDICINE
Payer: COMMERCIAL

## 2023-09-08 DIAGNOSIS — I45.10 INCOMPLETE RIGHT BUNDLE BRANCH BLOCK (RBBB): ICD-10-CM

## 2023-09-08 PROCEDURE — 93306 TTE W/DOPPLER COMPLETE: CPT

## 2023-09-08 PROCEDURE — 93016 CV STRESS TEST SUPVJ ONLY: CPT | Mod: ,,, | Performed by: INTERNAL MEDICINE

## 2023-09-08 PROCEDURE — 93018 CV STRESS TEST I&R ONLY: CPT | Mod: ,,, | Performed by: INTERNAL MEDICINE

## 2023-09-08 PROCEDURE — 93306 TTE W/DOPPLER COMPLETE: CPT | Mod: 26,,, | Performed by: INTERNAL MEDICINE

## 2023-09-08 PROCEDURE — 93306 ECHO (CUPID ONLY): ICD-10-PCS | Mod: 26,,, | Performed by: INTERNAL MEDICINE

## 2023-09-08 PROCEDURE — 93016 EXERCISE STRESS - EKG (CUPID ONLY): ICD-10-PCS | Mod: ,,, | Performed by: INTERNAL MEDICINE

## 2023-09-08 PROCEDURE — 93017 CV STRESS TEST TRACING ONLY: CPT

## 2023-09-08 PROCEDURE — 93018 EXERCISE STRESS - EKG (CUPID ONLY): ICD-10-PCS | Mod: ,,, | Performed by: INTERNAL MEDICINE

## 2023-09-10 LAB
ASCENDING AORTA: 2.83 CM
AV INDEX (PROSTH): 0.8
AV MEAN GRADIENT: 2 MMHG
AV PEAK GRADIENT: 3 MMHG
AV VALVE AREA BY VELOCITY RATIO: 3.3 CM²
AV VALVE AREA: 3.39 CM²
AV VELOCITY RATIO: 0.78
CV ECHO LV RWT: 0.37 CM
CV STRESS BASE HR: 78 BPM
DIASTOLIC BLOOD PRESSURE: 78 MMHG
DOP CALC AO PEAK VEL: 0.91 M/S
DOP CALC AO VTI: 12.6 CM
DOP CALC LVOT AREA: 4.2 CM2
DOP CALC LVOT DIAMETER: 2.32 CM
DOP CALC LVOT PEAK VEL: 0.71 M/S
DOP CALC LVOT STROKE VOLUME: 42.67 CM3
DOP CALC MV VTI: 12.2 CM
DOP CALCLVOT PEAK VEL VTI: 10.1 CM
ECHO LV POSTERIOR WALL: 0.93 CM (ref 0.6–1.1)
FRACTIONAL SHORTENING: 38 % (ref 28–44)
INTERVENTRICULAR SEPTUM: 0.82 CM (ref 0.6–1.1)
IVRT: 117.98 MSEC
LA MAJOR: 3.12 CM
LA MINOR: 3.92 CM
LA WIDTH: 3.7 CM
LEFT ATRIUM VOLUME MOD: 28.89 CM3
LEFT INTERNAL DIMENSION IN SYSTOLE: 3.12 CM (ref 2.1–4)
LEFT VENTRICLE DIASTOLIC VOLUME: 122.31 ML
LEFT VENTRICLE SYSTOLIC VOLUME: 38.54 ML
LEFT VENTRICULAR INTERNAL DIMENSION IN DIASTOLE: 5.07 CM (ref 3.5–6)
LEFT VENTRICULAR MASS: 156.09 G
LVOT MG: 1.01 MMHG
LVOT MV: 0.47 CM/S
MV MEAN GRADIENT: 1 MMHG
MV PEAK GRADIENT: 2 MMHG
MV STENOSIS PRESSURE HALF TIME: 27.7 MS
MV VALVE AREA BY CONTINUITY EQUATION: 3.5 CM2
MV VALVE AREA P 1/2 METHOD: 7.94 CM2
OHS CV CPX 1 MINUTE RECOVERY HEART RATE: 151 BPM
OHS CV CPX 85 PERCENT MAX PREDICTED HEART RATE MALE: 160
OHS CV CPX ESTIMATED METS: 19
OHS CV CPX MAX PREDICTED HEART RATE: 188
OHS CV CPX PATIENT IS FEMALE: 0
OHS CV CPX PATIENT IS MALE: 1
OHS CV CPX PEAK DIASTOLIC BLOOD PRESSURE: 70 MMHG
OHS CV CPX PEAK HEAR RATE: 187 BPM
OHS CV CPX PEAK RATE PRESSURE PRODUCT: NORMAL
OHS CV CPX PEAK SYSTOLIC BLOOD PRESSURE: 191 MMHG
OHS CV CPX PERCENT MAX PREDICTED HEART RATE ACHIEVED: 99
OHS CV CPX RATE PRESSURE PRODUCT PRESENTING: NORMAL
OHS LV EJECTION FRACTION SIMPSONS BIPLANE MOD: 47 %
PV PEAK GRADIENT: 4 MMHG
PV PEAK VELOCITY: 1.01 M/S
RA MAJOR: 3.86 CM
RA PRESSURE ESTIMATED: 3 MMHG
RA WIDTH: 3.7 CM
RIGHT VENTRICULAR END-DIASTOLIC DIMENSION: 2.58 CM
RV TISSUE DOPPLER FREE WALL SYSTOLIC VELOCITY 1 (APICAL 4 CHAMBER VIEW): 9.94 CM/S
SINUS: 3.73 CM
STJ: 2.94 CM
STRESS ANGINA INDEX: 0
STRESS DUKE TREADMILL SCORE: 16
STRESS ECHO POST EXERCISE DUR MIN: 16 MINUTES
STRESS ECHO POST EXERCISE DUR SEC: 16 SECONDS
STRESS ST DEPRESSION: 0 MM
STRESS ST ELEVATION: 0 MM
SYSTOLIC BLOOD PRESSURE: 133 MMHG
TDI LATERAL: 0.16 M/S
TDI SEPTAL: 0.09 M/S
TDI: 0.13 M/S
TRICUSPID ANNULAR PLANE SYSTOLIC EXCURSION: 1.71 CM

## 2023-09-11 DIAGNOSIS — I42.8 OTHER CARDIOMYOPATHY: Primary | ICD-10-CM

## 2023-09-11 NOTE — PROGRESS NOTES
I have reviewed the echocardiogram.  The heart ejection fraction is borderline.  I would like to get cardiac MRI    Please schedule the cardiac MRI

## 2023-09-12 ENCOUNTER — PATIENT MESSAGE (OUTPATIENT)
Dept: CARDIOLOGY | Facility: CLINIC | Age: 33
End: 2023-09-12
Payer: COMMERCIAL

## 2023-09-12 DIAGNOSIS — G47.9 SLEEP DISTURBANCE: Primary | ICD-10-CM

## 2023-09-12 NOTE — TELEPHONE ENCOUNTER
At this time the MRI will till the accurate Ejection fraction. It will be a better modality. I have placed a referral to sleep clinic for sleep apnea evaluation. Yes will recommend staying off stimulants     Sincerely,  Silvestre Springer MD.   Interventional Cardiologist  [facility], [place]

## 2023-11-14 DIAGNOSIS — I42.8 CARDIOMYOPATHY, NONISCHEMIC: Primary | ICD-10-CM

## 2023-11-14 DIAGNOSIS — R07.9 CHEST PAIN, UNSPECIFIED TYPE: ICD-10-CM

## 2024-10-25 ENCOUNTER — HOSPITAL ENCOUNTER (EMERGENCY)
Facility: HOSPITAL | Age: 34
Discharge: HOME | End: 2024-10-25
Attending: EMERGENCY MEDICINE
Payer: COMMERCIAL

## 2024-10-25 VITALS
HEART RATE: 90 BPM | WEIGHT: 180 LBS | SYSTOLIC BLOOD PRESSURE: 131 MMHG | DIASTOLIC BLOOD PRESSURE: 70 MMHG | RESPIRATION RATE: 15 BRPM | TEMPERATURE: 98 F | OXYGEN SATURATION: 99 % | BODY MASS INDEX: 23.75 KG/M2

## 2024-10-25 DIAGNOSIS — R07.9 CHEST PAIN: ICD-10-CM

## 2024-10-25 DIAGNOSIS — S29.012A STRAIN OF THORACIC BACK REGION: Primary | ICD-10-CM

## 2024-10-25 LAB
ALBUMIN SERPL BCP-MCNC: 4.1 G/DL (ref 3.5–5.2)
ALP SERPL-CCNC: 107 U/L (ref 40–150)
ALT SERPL W/O P-5'-P-CCNC: 33 U/L (ref 10–44)
ANION GAP SERPL CALC-SCNC: 8 MMOL/L (ref 8–16)
AST SERPL-CCNC: 27 U/L (ref 10–40)
BASOPHILS # BLD AUTO: 0.04 K/UL (ref 0–0.2)
BASOPHILS NFR BLD: 0.4 % (ref 0–1.9)
BILIRUB SERPL-MCNC: 0.5 MG/DL (ref 0.1–1)
BNP SERPL-MCNC: 19 PG/ML (ref 0–99)
BUN SERPL-MCNC: 17 MG/DL (ref 6–20)
CALCIUM SERPL-MCNC: 10 MG/DL (ref 8.7–10.5)
CHLORIDE SERPL-SCNC: 104 MMOL/L (ref 95–110)
CO2 SERPL-SCNC: 28 MMOL/L (ref 23–29)
CREAT SERPL-MCNC: 1.3 MG/DL (ref 0.5–1.4)
DIFFERENTIAL METHOD BLD: ABNORMAL
EOSINOPHIL # BLD AUTO: 0.2 K/UL (ref 0–0.5)
EOSINOPHIL NFR BLD: 2 % (ref 0–8)
ERYTHROCYTE [DISTWIDTH] IN BLOOD BY AUTOMATED COUNT: 12.1 % (ref 11.5–14.5)
EST. GFR  (NO RACE VARIABLE): >60 ML/MIN/1.73 M^2
GLUCOSE SERPL-MCNC: 108 MG/DL (ref 70–110)
HCT VFR BLD AUTO: 43.5 % (ref 40–54)
HCV AB SERPL QL IA: NORMAL
HGB BLD-MCNC: 14.7 G/DL (ref 14–18)
HIV 1+2 AB+HIV1 P24 AG SERPL QL IA: NORMAL
IMM GRANULOCYTES # BLD AUTO: 0.03 K/UL (ref 0–0.04)
IMM GRANULOCYTES NFR BLD AUTO: 0.3 % (ref 0–0.5)
LYMPHOCYTES # BLD AUTO: 3.3 K/UL (ref 1–4.8)
LYMPHOCYTES NFR BLD: 30.5 % (ref 18–48)
MCH RBC QN AUTO: 29.7 PG (ref 27–31)
MCHC RBC AUTO-ENTMCNC: 33.8 G/DL (ref 32–36)
MCV RBC AUTO: 88 FL (ref 82–98)
MONOCYTES # BLD AUTO: 0.9 K/UL (ref 0.3–1)
MONOCYTES NFR BLD: 8.7 % (ref 4–15)
NEUTROPHILS # BLD AUTO: 6.2 K/UL (ref 1.8–7.7)
NEUTROPHILS NFR BLD: 58.1 % (ref 38–73)
NRBC BLD-RTO: 0 /100 WBC
OHS QRS DURATION: 124 MS
OHS QTC CALCULATION: 449 MS
PLATELET # BLD AUTO: 283 K/UL (ref 150–450)
PMV BLD AUTO: 9.1 FL (ref 9.2–12.9)
POTASSIUM SERPL-SCNC: 3.5 MMOL/L (ref 3.5–5.1)
PROT SERPL-MCNC: 7.7 G/DL (ref 6–8.4)
RBC # BLD AUTO: 4.95 M/UL (ref 4.6–6.2)
SODIUM SERPL-SCNC: 140 MMOL/L (ref 136–145)
TROPONIN I SERPL DL<=0.01 NG/ML-MCNC: <0.006 NG/ML (ref 0–0.03)
WBC # BLD AUTO: 10.71 K/UL (ref 3.9–12.7)

## 2024-10-25 PROCEDURE — 80053 COMPREHEN METABOLIC PANEL: CPT

## 2024-10-25 PROCEDURE — 85025 COMPLETE CBC W/AUTO DIFF WBC: CPT

## 2024-10-25 PROCEDURE — 96374 THER/PROPH/DIAG INJ IV PUSH: CPT

## 2024-10-25 PROCEDURE — 86803 HEPATITIS C AB TEST: CPT | Performed by: PHYSICIAN ASSISTANT

## 2024-10-25 PROCEDURE — 25000003 PHARM REV CODE 250

## 2024-10-25 PROCEDURE — 87389 HIV-1 AG W/HIV-1&-2 AB AG IA: CPT | Performed by: PHYSICIAN ASSISTANT

## 2024-10-25 PROCEDURE — 83880 ASSAY OF NATRIURETIC PEPTIDE: CPT

## 2024-10-25 PROCEDURE — 99285 EMERGENCY DEPT VISIT HI MDM: CPT | Mod: 25

## 2024-10-25 PROCEDURE — 84484 ASSAY OF TROPONIN QUANT: CPT

## 2024-10-25 PROCEDURE — 63600175 PHARM REV CODE 636 W HCPCS

## 2024-10-25 PROCEDURE — 93005 ELECTROCARDIOGRAM TRACING: CPT

## 2024-10-25 RX ORDER — KETOROLAC TROMETHAMINE 30 MG/ML
30 INJECTION, SOLUTION INTRAMUSCULAR; INTRAVENOUS
Status: COMPLETED | OUTPATIENT
Start: 2024-10-25 | End: 2024-10-25

## 2024-10-25 RX ORDER — LORAZEPAM 0.5 MG/1
0.5 TABLET ORAL
Status: COMPLETED | OUTPATIENT
Start: 2024-10-25 | End: 2024-10-25

## 2024-10-25 RX ORDER — METHOCARBAMOL 500 MG/1
1000 TABLET, FILM COATED ORAL 3 TIMES DAILY PRN
Qty: 30 TABLET | Refills: 0 | Status: SHIPPED | OUTPATIENT
Start: 2024-10-25 | End: 2024-10-30

## 2024-10-25 RX ORDER — ACETAMINOPHEN 500 MG
1000 TABLET ORAL
Status: COMPLETED | OUTPATIENT
Start: 2024-10-25 | End: 2024-10-25

## 2024-10-25 RX ORDER — LIDOCAINE 50 MG/G
1 PATCH TOPICAL DAILY
Qty: 14 PATCH | Refills: 0 | Status: SHIPPED | OUTPATIENT
Start: 2024-10-25

## 2024-10-25 RX ORDER — METHOCARBAMOL 500 MG/1
500 TABLET, FILM COATED ORAL
Status: COMPLETED | OUTPATIENT
Start: 2024-10-25 | End: 2024-10-25

## 2024-10-25 RX ORDER — ASPIRIN 325 MG
325 TABLET ORAL
Status: COMPLETED | OUTPATIENT
Start: 2024-10-25 | End: 2024-10-25

## 2024-10-25 RX ADMIN — KETOROLAC TROMETHAMINE 30 MG: 30 INJECTION, SOLUTION INTRAMUSCULAR; INTRAVENOUS at 05:10

## 2024-10-25 RX ADMIN — ACETAMINOPHEN 1000 MG: 500 TABLET ORAL at 05:10

## 2024-10-25 RX ADMIN — NITROGLYCERIN 2 INCH: 20 OINTMENT TOPICAL at 04:10

## 2024-10-25 RX ADMIN — ASPIRIN 325 MG ORAL TABLET 325 MG: 325 PILL ORAL at 04:10

## 2024-10-25 RX ADMIN — LORAZEPAM 0.5 MG: 0.5 TABLET ORAL at 04:10

## 2024-10-25 RX ADMIN — METHOCARBAMOL 500 MG: 500 TABLET ORAL at 05:10

## 2024-10-29 ENCOUNTER — OFFICE VISIT (OUTPATIENT)
Dept: GASTROENTEROLOGY | Facility: CLINIC | Age: 34
End: 2024-10-29
Payer: COMMERCIAL

## 2024-10-29 ENCOUNTER — TELEPHONE (OUTPATIENT)
Dept: GASTROENTEROLOGY | Facility: CLINIC | Age: 34
End: 2024-10-29

## 2024-10-29 DIAGNOSIS — R07.89 ATYPICAL CHEST PAIN: ICD-10-CM

## 2024-10-29 DIAGNOSIS — R13.10 DYSPHAGIA, UNSPECIFIED TYPE: ICD-10-CM

## 2024-10-29 DIAGNOSIS — R10.13 DYSPEPSIA: Primary | ICD-10-CM

## 2024-10-29 PROCEDURE — 1159F MED LIST DOCD IN RCRD: CPT | Mod: CPTII,95,, | Performed by: NURSE PRACTITIONER

## 2024-10-29 PROCEDURE — 99204 OFFICE O/P NEW MOD 45 MIN: CPT | Mod: 95,,, | Performed by: NURSE PRACTITIONER

## 2024-10-29 RX ORDER — PANTOPRAZOLE SODIUM 40 MG/1
40 TABLET, DELAYED RELEASE ORAL DAILY
Qty: 30 TABLET | Refills: 11 | Status: SHIPPED | OUTPATIENT
Start: 2024-10-29 | End: 2025-10-29

## 2024-10-29 NOTE — H&P (VIEW-ONLY)
GASTROENTEROLOGY CLINIC NOTE  The patient location is: [place]  The chief complaint leading to consultation is: Dysphagia, dyspepsia    Visit type: audiovisual    Face to Face time with patient: 16:35  40 minutes of total time spent on the encounter, which includes face to face time and non-face to face time preparing to see the patient (eg, review of tests), Obtaining and/or reviewing separately obtained history, Documenting clinical information in the electronic or other health record, Independently interpreting results (not separately reported) and communicating results to the patient/family/caregiver, or Care coordination (not separately reported).       Each patient to whom he or she provides medical services by telemedicine is:  (1) informed of the relationship between the physician and patient and the respective role of any other health care provider with respect to management of the patient; and (2) notified that he or she may decline to receive medical services by telemedicine and may withdraw from such care at any time.       Chief Complaint: The primary encounter diagnosis was Dyspepsia. Diagnoses of Dysphagia, unspecified type and Atypical chest pain were also pertinent to this visit.  Referring provider/PCP: Wilfrido Newman MD      Yuan Bains is a 34 y.o. male who is a new patient to me who presents via telemedicine encounter for dysphagia and dyspepsia. Recently sought care in ER for episode of chest pain and back pain.   Chest pain ongoing for three days prior to ED visit; pain worse with leaning forward or back and substantially worse with laying down. Feel like spasm in chest. This has improved. He continues to feel chest pressure describes feeling as food stuck in chest. He also notes feeling of food sticking mid chest while eating; not worse with any particular foods. Having more frequent heartburn and belching recently. No significant NSAID use. Has known hx of PVCs.   He has not tried  anything for symptoms.       GLP-1s: No  NSAIDs: No  Anticoagulation or Antiplatelet: No    History of H.pylori:  H.pylori Treatment:  Prior Upper Endoscopy: no  Prior Colonoscopy: no  Family h/o Colon Cancer: No  Family h/o Crohn's Disease or Ulcerative Colitis: No  Family h/o Celiac Sprue: Niece  Abdominal Surgeries: no      Review of Systems   Constitutional:  Negative for weight loss.   HENT:  Negative for sore throat.    Eyes:  Negative for blurred vision.   Respiratory:  Negative for cough.    Cardiovascular:  Positive for chest pain.   Gastrointestinal:  Positive for heartburn. Negative for abdominal pain, blood in stool, constipation, diarrhea, melena, nausea and vomiting.   Genitourinary:  Negative for dysuria.   Musculoskeletal:  Negative for myalgias.   Skin:  Negative for rash.   Neurological:  Negative for headaches.   Endo/Heme/Allergies:  Negative for environmental allergies.   Psychiatric/Behavioral:  Negative for suicidal ideas. The patient is not nervous/anxious.        Past Medical History: has no past medical history on file.    Past Surgical History: has no past surgical history on file.    Family History:family history includes Breast cancer in his maternal aunt and mother; Skin cancer in his mother.    Allergies: Review of patient's allergies indicates:  No Known Allergies    Social History: reports that he has never smoked. He has never used smokeless tobacco.    Home medications:   Current Outpatient Medications on File Prior to Visit   Medication Sig Dispense Refill    creatine monohydrate (CREATINE ORAL) Take by mouth.      dextroamphetamine sulfate (DEXEDRINE SPANSULE) 15 MG 24 hr capsule Take 15 mg by mouth every morning.      dextroamphetamine-amphetamine (ADDERALL) 15 mg tablet Take 15 mg by mouth every morning.      LIDOcaine (LIDODERM) 5 % Place 1 patch onto the skin once daily. Remove & Discard patch within 12 hours or as directed by MD Melo patch 0    methocarbamoL (ROBAXIN) 500 MG  "Tab Take 2 tablets (1,000 mg total) by mouth 3 (three) times daily as needed (pain). 30 tablet 0    multivitamin (ONE DAILY MULTIVITAMIN) per tablet Take 1 tablet by mouth once daily.       No current facility-administered medications on file prior to visit.       Vital signs:  There were no vitals taken for this visit.    Physical Exam  Constitutional:       Appearance: Normal appearance. He is not ill-appearing.      Comments: Limited Physical Exam d/t Telemedicine Encounter   HENT:      Head: Normocephalic.   Pulmonary:      Effort: Pulmonary effort is normal. No respiratory distress.   Neurological:      Mental Status: He is alert and oriented to person, place, and time.   Psychiatric:         Mood and Affect: Mood normal.         Behavior: Behavior normal.         Thought Content: Thought content normal.         Judgment: Judgment normal.         Routine labs:  Lab Results   Component Value Date    WBC 10.71 10/25/2024    HGB 14.7 10/25/2024    HCT 43.5 10/25/2024    MCV 88 10/25/2024     10/25/2024     No results found for: "INR"  No results found for: "IRON", "FERRITIN", "TIBC", "FESATURATED"  Lab Results   Component Value Date     10/25/2024    K 3.5 10/25/2024     10/25/2024    CO2 28 10/25/2024    BUN 17 10/25/2024    CREATININE 1.3 10/25/2024     Lab Results   Component Value Date    ALBUMIN 4.1 10/25/2024    ALT 33 10/25/2024    AST 27 10/25/2024    ALKPHOS 107 10/25/2024    BILITOT 0.5 10/25/2024     No results found for: "GLUCOSE"  No results found for: "TSH"  Lab Results   Component Value Date    CALCIUM 10.0 10/25/2024       Imaging:  X-Ray Chest AP Portable  Narrative: EXAMINATION:  XR CHEST AP PORTABLE    CLINICAL HISTORY:  Chest Pain;    TECHNIQUE:  Single frontal view of the chest was performed.    COMPARISON:  None.    FINDINGS:  Cardiac monitoring leads overlie the chest.  Trachea and mediastinal structures are midline.  Cardiomediastinal silhouette is within normal limits.  " The lungs are symmetrically expanded without evidence of confluent airspace consolidation, substantial volume of pleural fluid or pneumothorax.  Visualized osseous structures are intact.  Or pneumothorax.  Regional osseous structures are unremarkable.  Impression: No acute intrathoracic abnormality identified on this single radiographic view of the chest.    Electronically signed by resident: Jean-Pierre Sevilla  Date:    10/25/2024  Time:    04:49    Electronically signed by: Silvina Rolon MD  Date:    10/25/2024  Time:    05:14      I have reviewed prior labs, imaging, and notes.      Assessment:  1. Dyspepsia    2. Dysphagia, unspecified type    3. Atypical chest pain      Episode of chest pain worse with laying down or bending forward described as spasm prompted ED visit. Has hx of PVCs but cardiac workup unrevealing in ED. Per ED note, pain improved with Tylenol.   Feeling of food sticking in middle of chest with eating as well as more frequent heartburn/indigestion.     Plan:  Orders Placed This Encounter    Ambulatory referral/consult to Allergy    pantoprazole (PROTONIX) 40 MG tablet    Case Request Endoscopy: EGD (ESOPHAGOGASTRODUODENOSCOPY)     Protonix 40mg daily.   EGD. Consider biopsy for celiac. Recent family history.   Consider cardiology referral.     Plan of care discussed with patient who is in agreement and verbalized understanding.     I have explained the planned procedures to the patient.The risks, benefits and alternatives of the procedure were also explained in detail. Patient verbalized understanding, all questions were answered. The patient agrees to proceed as planned    Follow Up: ROYA Rosa, APRN,FNP-BC  [facility]    (Portions of this note were dictated using voice recognition software and may contain dictation related errors in spelling/grammar/syntax not found on text review)

## 2024-10-31 ENCOUNTER — HOSPITAL ENCOUNTER (OUTPATIENT)
Facility: HOSPITAL | Age: 34
Discharge: HOME | End: 2024-10-31
Attending: INTERNAL MEDICINE | Admitting: INTERNAL MEDICINE
Payer: COMMERCIAL

## 2024-10-31 ENCOUNTER — ANESTHESIA (OUTPATIENT)
Dept: ENDOSCOPY | Facility: HOSPITAL | Age: 34
End: 2024-10-31
Payer: COMMERCIAL

## 2024-10-31 ENCOUNTER — ANESTHESIA EVENT (OUTPATIENT)
Dept: ENDOSCOPY | Facility: HOSPITAL | Age: 34
End: 2024-10-31
Payer: COMMERCIAL

## 2024-10-31 VITALS
TEMPERATURE: 99 F | RESPIRATION RATE: 18 BRPM | DIASTOLIC BLOOD PRESSURE: 63 MMHG | WEIGHT: 177 LBS | SYSTOLIC BLOOD PRESSURE: 110 MMHG | BODY MASS INDEX: 23.46 KG/M2 | HEART RATE: 64 BPM | OXYGEN SATURATION: 98 % | HEIGHT: 73 IN

## 2024-10-31 DIAGNOSIS — R13.10 DYSPHAGIA: ICD-10-CM

## 2024-10-31 PROCEDURE — 37000009 HC ANESTHESIA EA ADD 15 MINS: Performed by: INTERNAL MEDICINE

## 2024-10-31 PROCEDURE — 27201012 HC FORCEPS, HOT/COLD, DISP: Performed by: INTERNAL MEDICINE

## 2024-10-31 PROCEDURE — 88305 TISSUE EXAM BY PATHOLOGIST: CPT | Performed by: PATHOLOGY

## 2024-10-31 PROCEDURE — 63600175 PHARM REV CODE 636 W HCPCS

## 2024-10-31 PROCEDURE — 43248 EGD GUIDE WIRE INSERTION: CPT | Mod: ,,, | Performed by: INTERNAL MEDICINE

## 2024-10-31 PROCEDURE — 25000003 PHARM REV CODE 250

## 2024-10-31 PROCEDURE — 88305 TISSUE EXAM BY PATHOLOGIST: CPT | Mod: 26,,, | Performed by: PATHOLOGY

## 2024-10-31 PROCEDURE — 43248 EGD GUIDE WIRE INSERTION: CPT | Performed by: INTERNAL MEDICINE

## 2024-10-31 PROCEDURE — 37000008 HC ANESTHESIA 1ST 15 MINUTES: Performed by: INTERNAL MEDICINE

## 2024-10-31 RX ORDER — LIDOCAINE HYDROCHLORIDE 20 MG/ML
INJECTION INTRAVENOUS
Status: DISCONTINUED | OUTPATIENT
Start: 2024-10-31 | End: 2024-10-31

## 2024-10-31 RX ORDER — PROPOFOL 10 MG/ML
VIAL (ML) INTRAVENOUS
Status: DISCONTINUED | OUTPATIENT
Start: 2024-10-31 | End: 2024-10-31

## 2024-10-31 RX ORDER — PROPOFOL 10 MG/ML
VIAL (ML) INTRAVENOUS CONTINUOUS PRN
Status: DISCONTINUED | OUTPATIENT
Start: 2024-10-31 | End: 2024-10-31

## 2024-10-31 RX ORDER — SODIUM CHLORIDE 9 MG/ML
INJECTION, SOLUTION INTRAVENOUS CONTINUOUS
Status: DISCONTINUED | OUTPATIENT
Start: 2024-10-31 | End: 2024-10-31 | Stop reason: HOSPADM

## 2024-10-31 RX ORDER — TOPICAL ANESTHETIC 200 MG/ML
SPRAY DENTAL; PERIODONTAL
Status: DISCONTINUED | OUTPATIENT
Start: 2024-10-31 | End: 2024-10-31

## 2024-10-31 RX ADMIN — PROPOFOL 150 MCG/KG/MIN: 10 INJECTION, EMULSION INTRAVENOUS at 11:10

## 2024-10-31 RX ADMIN — TOPICAL ANESTHETIC 1 EACH: 200 SPRAY DENTAL; PERIODONTAL at 11:10

## 2024-10-31 RX ADMIN — LIDOCAINE HYDROCHLORIDE 100 MG: 20 INJECTION, SOLUTION INTRAVENOUS at 11:10

## 2024-10-31 RX ADMIN — PROPOFOL 20 MG: 10 INJECTION, EMULSION INTRAVENOUS at 11:10

## 2024-10-31 RX ADMIN — PROPOFOL 50 MG: 10 INJECTION, EMULSION INTRAVENOUS at 11:10

## 2024-10-31 RX ADMIN — PROPOFOL 70 MG: 10 INJECTION, EMULSION INTRAVENOUS at 11:10

## 2024-10-31 RX ADMIN — SODIUM CHLORIDE: 0.9 INJECTION, SOLUTION INTRAVENOUS at 11:10

## 2024-10-31 NOTE — PROVATION PATIENT INSTRUCTIONS
Discharge Summary/Instructions after an Endoscopic Procedure  Patient Name: Yuan Bains  Patient MRN: [id number]  Patient YOB: 1990     Thursday, October 31, 2024  Jose David Ferreira MD  Dear patient,  As a result of recent federal legislation (The Federal Cures Act), you may   receive lab or pathology results from your procedure in your [facility]   account before your physician is able to contact you. Your physician or   their representative will relay the results to you with their   recommendations at their soonest availability.  Thank you,  Your health is very important to us during the Covid Crisis. Following your   procedure today, you will receive a daily text for 2 weeks asking about   signs or symptoms of Covid 19.  Please respond to this text when you   receive it so we can follow up and keep you as safe as possible.   RESTRICTIONS:  During your procedure today, you received medications for sedation.  These   medications may affect your judgment, balance and coordination.  Therefore,   for 24 hours, you have the following restrictions:   - DO NOT drive a car, operate machinery, make legal/financial decisions,   sign important papers or drink alcohol.    ACTIVITY:  Today: no heavy lifting, straining or running due to procedural   sedation/anesthesia.  The following day: return to full activity including work.  DIET:  Eat and drink normally unless instructed otherwise.     TREATMENT FOR COMMON SIDE EFFECTS:  - Mild abdominal pain, nausea, belching, bloating or excessive gas:  rest,   eat lightly and use a heating pad.  - Sore Throat: treat with throat lozenges and/or gargle with warm salt   water.  - Because air was used during the procedure, expelling large amounts of air   from your rectum or belching is normal.  - If a bowel prep was taken, you may not have a bowel movement for 1-3 days.    This is normal.  SYMPTOMS TO WATCH FOR AND REPORT TO YOUR PHYSICIAN:  1. Abdominal pain or bloating, other than  gas cramps.  2. Chest pain.  3. Back pain.  4. Signs of infection such as: chills or fever occurring within 24 hours   after the procedure.  5. Rectal bleeding, which would show as bright red, maroon, or black stools.   (A tablespoon of blood from the rectum is not serious, especially if   hemorrhoids are present.)  6. Vomiting.  7. Weakness or dizziness.  GO DIRECTLY TO THE NEAREST EMERGENCY ROOM IF YOU HAVE ANY OF THE FOLLOWING:      Difficulty breathing              Chills and/or fever over 101 F   Persistent vomiting and/or vomiting blood   Severe abdominal pain   Severe chest pain   Black, tarry stools   Bleeding- more than one tablespoon   Any other symptom or condition that you feel may need urgent attention  Your doctor recommends these additional instructions:  If any biopsies were taken, your doctors clinic will contact you in 1 to 2   weeks with any results.  - Discharge patient to home.   - Patient has a contact number available for emergencies.  The signs and   symptoms of potential delayed complications were discussed with the   patient.  Return to normal activities tomorrow.  Written discharge   instructions were provided to the patient.   - Resume previous diet.   - Continue present medications.   - Await pathology results.   - I suspect his symptoms may be from intermittent bouts of acid reflux, and   thus would trial standard PPI for at least 3 months to see if that improves   symptoms.  For questions, problems or results please call your physician - Jose David Ferreira MD.  EMERGENCY PHONE NUMBER: [phone],  LAB RESULTS: [phone]  IF A COMPLICATION OR EMERGENCY SITUATION ARISES AND YOU ARE UNABLE TO REACH   YOUR PHYSICIAN - GO DIRECTLY TO THE EMERGENCY ROOM.  Jose David Ferreira MD  10/31/2024 11:38:38 AM  This report has been verified and signed electronically.  Dear patient,  As a result of recent federal legislation (The Federal Cures Act), you may   receive lab or pathology results  from your procedure in your [facility]   account before your physician is able to contact you. Your physician or   their representative will relay the results to you with their   recommendations at their soonest availability.  Thank you,  PROVATION

## 2024-11-04 LAB
FINAL PATHOLOGIC DIAGNOSIS: NORMAL
GROSS: NORMAL
Lab: NORMAL

## 2024-11-07 ENCOUNTER — OFFICE VISIT (OUTPATIENT)
Dept: INTERNAL MEDICINE | Facility: CLINIC | Age: 34
End: 2024-11-07
Payer: COMMERCIAL

## 2024-11-07 ENCOUNTER — LAB VISIT (OUTPATIENT)
Dept: LAB | Facility: HOSPITAL | Age: 34
End: 2024-11-07
Payer: COMMERCIAL

## 2024-11-07 VITALS
DIASTOLIC BLOOD PRESSURE: 80 MMHG | SYSTOLIC BLOOD PRESSURE: 138 MMHG | OXYGEN SATURATION: 99 % | HEART RATE: 58 BPM | BODY MASS INDEX: 24.58 KG/M2 | HEIGHT: 73 IN | WEIGHT: 185.44 LBS

## 2024-11-07 DIAGNOSIS — Z76.89 ENCOUNTER TO ESTABLISH CARE WITH NEW DOCTOR: ICD-10-CM

## 2024-11-07 DIAGNOSIS — D17.79 LIPOMA OF OTHER SPECIFIED SITES: ICD-10-CM

## 2024-11-07 DIAGNOSIS — R07.89 OTHER CHEST PAIN: ICD-10-CM

## 2024-11-07 DIAGNOSIS — R10.13 EPIGASTRIC PAIN: ICD-10-CM

## 2024-11-07 DIAGNOSIS — I86.1 VARICOCELE: ICD-10-CM

## 2024-11-07 DIAGNOSIS — Z23 NEED FOR VACCINATION: ICD-10-CM

## 2024-11-07 DIAGNOSIS — J34.2 ACQUIRED DEVIATED NASAL SEPTUM: ICD-10-CM

## 2024-11-07 DIAGNOSIS — Z00.00 ENCOUNTER FOR ANNUAL GENERAL MEDICAL EXAMINATION WITHOUT ABNORMAL FINDINGS IN ADULT: ICD-10-CM

## 2024-11-07 DIAGNOSIS — Z00.00 ENCOUNTER FOR ANNUAL GENERAL MEDICAL EXAMINATION WITHOUT ABNORMAL FINDINGS IN ADULT: Primary | ICD-10-CM

## 2024-11-07 LAB
ALBUMIN SERPL BCP-MCNC: 4.1 G/DL (ref 3.5–5.2)
ALP SERPL-CCNC: 116 U/L (ref 40–150)
ALT SERPL W/O P-5'-P-CCNC: 29 U/L (ref 10–44)
ANION GAP SERPL CALC-SCNC: 8 MMOL/L (ref 8–16)
AST SERPL-CCNC: 28 U/L (ref 10–40)
BASOPHILS # BLD AUTO: 0.09 K/UL (ref 0–0.2)
BASOPHILS NFR BLD: 1.1 % (ref 0–1.9)
BILIRUB SERPL-MCNC: 0.4 MG/DL (ref 0.1–1)
BUN SERPL-MCNC: 15 MG/DL (ref 6–20)
CALCIUM SERPL-MCNC: 9.7 MG/DL (ref 8.7–10.5)
CHLORIDE SERPL-SCNC: 105 MMOL/L (ref 95–110)
CO2 SERPL-SCNC: 28 MMOL/L (ref 23–29)
CREAT SERPL-MCNC: 1.3 MG/DL (ref 0.5–1.4)
DIFFERENTIAL METHOD BLD: NORMAL
EOSINOPHIL # BLD AUTO: 0.3 K/UL (ref 0–0.5)
EOSINOPHIL NFR BLD: 3.5 % (ref 0–8)
ERYTHROCYTE [DISTWIDTH] IN BLOOD BY AUTOMATED COUNT: 12.2 % (ref 11.5–14.5)
EST. GFR  (NO RACE VARIABLE): >60 ML/MIN/1.73 M^2
ESTIMATED AVG GLUCOSE: 94 MG/DL (ref 68–131)
GLUCOSE SERPL-MCNC: 90 MG/DL (ref 70–110)
HBA1C MFR BLD: 4.9 % (ref 4–5.6)
HCT VFR BLD AUTO: 45.3 % (ref 40–54)
HGB BLD-MCNC: 14.6 G/DL (ref 14–18)
IMM GRANULOCYTES # BLD AUTO: 0.01 K/UL (ref 0–0.04)
IMM GRANULOCYTES NFR BLD AUTO: 0.1 % (ref 0–0.5)
LYMPHOCYTES # BLD AUTO: 3.4 K/UL (ref 1–4.8)
LYMPHOCYTES NFR BLD: 42.2 % (ref 18–48)
MCH RBC QN AUTO: 28.9 PG (ref 27–31)
MCHC RBC AUTO-ENTMCNC: 32.2 G/DL (ref 32–36)
MCV RBC AUTO: 90 FL (ref 82–98)
MONOCYTES # BLD AUTO: 0.6 K/UL (ref 0.3–1)
MONOCYTES NFR BLD: 7.8 % (ref 4–15)
NEUTROPHILS # BLD AUTO: 3.6 K/UL (ref 1.8–7.7)
NEUTROPHILS NFR BLD: 45.3 % (ref 38–73)
NRBC BLD-RTO: 0 /100 WBC
PLATELET # BLD AUTO: 319 K/UL (ref 150–450)
PMV BLD AUTO: 9.6 FL (ref 9.2–12.9)
POTASSIUM SERPL-SCNC: 4.8 MMOL/L (ref 3.5–5.1)
PROT SERPL-MCNC: 7.3 G/DL (ref 6–8.4)
RBC # BLD AUTO: 5.05 M/UL (ref 4.6–6.2)
SODIUM SERPL-SCNC: 141 MMOL/L (ref 136–145)
T4 FREE SERPL-MCNC: 0.94 NG/DL (ref 0.71–1.51)
TSH SERPL DL<=0.005 MIU/L-ACNC: 1.39 UIU/ML (ref 0.4–4)
WBC # BLD AUTO: 7.96 K/UL (ref 3.9–12.7)

## 2024-11-07 PROCEDURE — 85025 COMPLETE CBC W/AUTO DIFF WBC: CPT | Performed by: FAMILY MEDICINE

## 2024-11-07 PROCEDURE — 99999 PR PBB SHADOW E&M-EST. PATIENT-LVL V: CPT | Mod: PBBFAC,,, | Performed by: FAMILY MEDICINE

## 2024-11-07 PROCEDURE — 84443 ASSAY THYROID STIM HORMONE: CPT | Performed by: FAMILY MEDICINE

## 2024-11-07 PROCEDURE — 80053 COMPREHEN METABOLIC PANEL: CPT | Performed by: FAMILY MEDICINE

## 2024-11-07 PROCEDURE — 83036 HEMOGLOBIN GLYCOSYLATED A1C: CPT | Performed by: FAMILY MEDICINE

## 2024-11-07 PROCEDURE — 84439 ASSAY OF FREE THYROXINE: CPT | Performed by: FAMILY MEDICINE

## 2024-11-07 PROCEDURE — 36415 COLL VENOUS BLD VENIPUNCTURE: CPT | Performed by: FAMILY MEDICINE

## 2024-11-07 NOTE — PROGRESS NOTES
Subjective:     Patient ID: Yuan Bains is a 34 y.o. male.   Chief Complaint: Establish Care and Annual Exam    HPI:  History of Present Illness    CHIEF COMPLAINT:  Patient presents for a new primary care physician appointment, with concerns about recent chest and back pain that led to an ER visit, as well as ongoing esophageal discomfort and swallowing difficulties.    HPI:  Patient reports chest and back pain 3 weeks ago, resulting in an emergency room visit. The chest pain was described as a sensation of food lodged in the esophagus, similar to occasional occurrences since childhood. This recent episode involved constant, severe pain lasting almost 2 days, worsening in recumbent position or when leaning forward. Patient believes he concurrently sustained a back muscle strain, intensifying his discomfort. He had a panic attack during the ED encounter.    Following the ED visit, the patient underwent endoscopy last week, with unremarkable results for esophagus and stomach, and negative celiac disease testing. He continues to have dysphagia, describing impaired fluid passage from upper to lower chest, accompanied by intermittent pain, most notable during food or liquid consumption. Occasional eructation appears to alleviate the sensation of delayed esophageal emptying.    Patient recalls a similar episode of severe pain 3 months ago, lasting 2 days, without associated back pain. He has been taking pantoprazole daily since the ED visit, partially alleviating the pain.    Prior to the recent ED visit, the patient consumed high amounts of caffeine and creatine supplements, which he has since discontinued. He notes significant improvements in anxiety levels and cognitive clarity since ceasing caffeine intake, though he still has occasional headaches.    Patient has a history of cardiac concerns. Last year, an EKG showed an abnormality, leading to a cardiology consultation. A subsequent test revealed a 45-50% EF, and he  was diagnosed with heart failure, though follow-up was inconsistent. He has an appointment with a new cardiologist next week to address these concerns.    Patient reports multiple lipomas throughout his body, with one on his back causing pain on palpation. He has a deviated septum from previous nasal fractures, causing unilateral nasal obstruction. He also describes symptoms consistent with a left varicocele and recent right testicular pain.    Patient denies current smoking and any known medication allergies.    MEDICATIONS:  Patient is on Pantoprazole every morning since an ED visit 3 weeks ago for suspected acid reflux. He is also taking Robaxin (muscle relaxer), 0.5-2 tablets at night when pain is severe for back pain, which he has taken 2-3 times since the ED visit. He takes a daily multivitamin.    MEDICAL HISTORY:  Patient has a history of questionable heart failure diagnosed in 2023, with a 45-50% EF detected on echocardiogram. He was diagnosed with acid reflux in 2024. He has multiple lipomas on his back and legs, a mucocele on his lip for about 2 years, degenerative disc disease in the lowest disc, a torn labrum in his R hip, meralgia paresthetica in his right leg, and a varicocele in his left testicle.    SURGICAL HISTORY:  Patient underwent a colonoscopy in 2013 due to rectal bleeding, which revealed fissures. In 2022, he had a lipoma removal on his back, which was complicated by infection requiring multiple procedures. At age 15, he had nasal surgery to repair a broken nose using wires. He also had an endoscopy last week.    SOCIAL HISTORY:  Patient rarely consumes alcohol, having had 2 glasses of wine the night before the appointment. He has never smoked. He previously had heavy caffeine consumption but has recently discontinued it.         Review of Problems & History:  Patient Active Problem List   Diagnosis    Chronic right hip pain    Chronic left shoulder pain      No past medical history on file.  "  Past Surgical History:   Procedure Laterality Date    ESOPHAGOGASTRODUODENOSCOPY N/A 10/31/2024    Procedure: EGD (ESOPHAGOGASTRODUODENOSCOPY);  Surgeon: Jose David Ferreira MD;  Location: [facility];  Service: Endoscopy;  Laterality: N/A;      Social History     Socioeconomic History    Marital status:    Tobacco Use    Smoking status: Never    Smokeless tobacco: Never   Substance and Sexual Activity    Alcohol use: Yes     Comment: very rare    Drug use: Never    Sexual activity: Yes     Partners: Female     Birth control/protection: None        Medication Review:    Current Outpatient Medications:     multivitamin (ONE DAILY MULTIVITAMIN) per tablet, Take 1 tablet by mouth once daily., Disp: , Rfl:     pantoprazole (PROTONIX) 40 MG tablet, Take 1 tablet (40 mg total) by mouth once daily., Disp: 30 tablet, Rfl: 11     Review of Systems   Constitutional:  Negative for chills, fatigue and fever.   HENT:  Positive for trouble swallowing. Negative for nasal congestion, ear pain, postnasal drip and sore throat.    Eyes:  Negative for visual disturbance.   Respiratory:  Negative for cough, shortness of breath and wheezing.    Cardiovascular:  Positive for chest pain. Negative for palpitations.   Gastrointestinal:  Positive for reflux. Negative for abdominal pain, blood in stool, change in bowel habit, constipation, diarrhea, nausea and vomiting.   Genitourinary:  Negative for dysuria and hematuria.   Musculoskeletal:  Negative for back pain, leg pain and neck pain.   Neurological:  Negative for dizziness, numbness and headaches.   Hematological:  Negative for adenopathy.   Psychiatric/Behavioral:  Negative for dysphoric mood, sleep disturbance and suicidal ideas. The patient is not nervous/anxious.           Objective:      Vitals:    11/07/24 0939   BP: 138/80   BP Location: Right arm   Patient Position: Sitting   Pulse: (!) 58   SpO2: 99%   Weight: 84.1 kg (185 lb 6.5 oz)   Height: 6' 1" (1.854 m)      Physical " Exam  Vitals reviewed.   Constitutional:       General: He is not in acute distress.     Appearance: Normal appearance. He is normal weight. He is not ill-appearing.   HENT:      Head: Normocephalic and atraumatic.      Right Ear: Tympanic membrane, ear canal and external ear normal. There is no impacted cerumen.      Left Ear: Tympanic membrane, ear canal and external ear normal. There is no impacted cerumen.      Nose: Nose normal. No congestion or rhinorrhea.      Mouth/Throat:      Mouth: Mucous membranes are moist.      Pharynx: Oropharynx is clear. No oropharyngeal exudate or posterior oropharyngeal erythema.   Eyes:      General: No scleral icterus.        Right eye: No discharge.         Left eye: No discharge.      Conjunctiva/sclera: Conjunctivae normal.   Neck:      Thyroid: No thyroid mass, thyromegaly or thyroid tenderness.   Cardiovascular:      Rate and Rhythm: Normal rate and regular rhythm.      Pulses: Normal pulses.      Heart sounds: Normal heart sounds. No murmur heard.     No friction rub. No gallop.   Pulmonary:      Effort: Pulmonary effort is normal. No respiratory distress.      Breath sounds: Normal breath sounds. No stridor. No wheezing, rhonchi or rales.   Chest:      Chest wall: No tenderness.   Abdominal:      General: Abdomen is flat. Bowel sounds are normal. There is no distension.      Palpations: Abdomen is soft. There is no mass.      Tenderness: There is abdominal tenderness in the epigastric area. There is no guarding.      Hernia: No hernia is present.   Musculoskeletal:         General: No swelling or deformity. Normal range of motion.      Cervical back: Normal range of motion and neck supple. No tenderness.   Lymphadenopathy:      Cervical: No cervical adenopathy.   Skin:     General: Skin is warm and dry.   Neurological:      General: No focal deficit present.      Mental Status: He is alert and oriented to person, place, and time. Mental status is at baseline.      Gait:  Gait normal.      Deep Tendon Reflexes: Reflexes normal.   Psychiatric:         Mood and Affect: Mood normal.         Behavior: Behavior normal.         Thought Content: Thought content normal.         Judgment: Judgment normal.           Assessment/Plan:             ICD-10-CM ICD-9-CM   1. Encounter for annual general medical examination without abnormal findings in adult  Z00.00 V70.0   2. Encounter to establish care with new doctor  Z76.89 V65.8   3. Chest pain  R07.9 786.50   4. Need for vaccination  Z23 V05.9   5. Lipoma of other specified sites  D17.79 214.8   6. Acquired deviated nasal septum  J34.2 470   7. Varicocele  I86.1 456.4   8. Epigastric pain  R10.13 789.06     Orders Placed This Encounter   Procedures    US Abdomen Limited    Comprehensive Metabolic Panel    CBC Auto Differential    Hemoglobin A1C    TSH    T4, Free    Ambulatory referral/consult to Dermatology    Ambulatory referral/consult to ENT    Ambulatory referral/consult to Urology       Assessment & Plan    - Evaluated chest pain and difficulty swallowing; symptoms suggestive of acid reflux or esophageal spasm  - Considered recent ER visit and cardiac workup showing normal results as reassuring  - Assessed recent endoscopy results showing normal esophagus and stomach  - Evaluated lipomas; most appear benign but 1 on back causing pain  - Considered possible septum deviation and sleep apnea based on history of nasal fractures  - Assessed right testicle pain in context of left-sided varicocele  - Considered possibility of gallstones given family history and upper abdominal symptoms    CHEST PAIN:  - Explained chest pain lasting 2 days without hospitalization is likely not cardiac in nature.    ACID REFLUX:  - Discussed that acid reflux medication may take up to 4 weeks to show full effect.  - Continued pantoprazole daily in the morning for acid reflux.  - Continue to follow-up with GI as scheduled/indicated      VARICOCELE:  - Referred to  urology for evaluation of left varicocele and right testicle pain.    BACK PAIN:  - Patient to discontinue long jumping to reduce spinal impact.  - Continued as-needed use of muscle relaxer (likely Robaxin) for back pain, up to 2 tablets when pain is severe at night.    LABS:  - Ordered CBC, metabolic panel, diabetic screen, and thyroid function test.  - Ordered abdominal ultrasound to evaluate upper abdominal organs and esophagus.    LIPOMA:  - Referred to dermatology for evaluation of lipomas.    ENT REFERRAL:  - Referred to ENT for assessment of septum deviation and potential sleep apnea.      FOLLOW UP:  - Follow up after completing labs and ultrasound for review of results.  - Contact office if new symptoms develop or current symptoms worsen.  - Follow up after specialist appointments to discuss findings and treatment plans.                   No follow-ups on file.     Jeff Shipman MD, [facility]  Family Medicine Physician  [facility]  11/07/2024    This note was generated with the assistance of ambient listening technology. Verbal consent was obtained by the patient and accompanying visitor(s) for the recording of patient appointment to facilitate this note. I attest to having reviewed and edited the generated note for accuracy, though some syntax or spelling errors may persist. Please contact the author of this note for any clarification.

## 2024-11-08 ENCOUNTER — PATIENT MESSAGE (OUTPATIENT)
Dept: INTERNAL MEDICINE | Facility: CLINIC | Age: 34
End: 2024-11-08
Payer: COMMERCIAL

## 2024-11-08 ENCOUNTER — HOSPITAL ENCOUNTER (OUTPATIENT)
Dept: RADIOLOGY | Facility: HOSPITAL | Age: 34
Discharge: HOME | End: 2024-11-08
Attending: FAMILY MEDICINE
Payer: COMMERCIAL

## 2024-11-08 DIAGNOSIS — R10.13 EPIGASTRIC PAIN: ICD-10-CM

## 2024-11-08 DIAGNOSIS — R93.5 ABNORMAL ULTRASOUND OF ABDOMEN: Primary | ICD-10-CM

## 2024-11-08 PROCEDURE — 76705 ECHO EXAM OF ABDOMEN: CPT | Mod: 26,,, | Performed by: RADIOLOGY

## 2024-11-08 PROCEDURE — 76705 ECHO EXAM OF ABDOMEN: CPT | Mod: TC

## 2024-11-13 ENCOUNTER — OFFICE VISIT (OUTPATIENT)
Dept: CARDIOLOGY | Facility: CLINIC | Age: 34
End: 2024-11-13
Payer: COMMERCIAL

## 2024-11-13 VITALS
SYSTOLIC BLOOD PRESSURE: 132 MMHG | BODY MASS INDEX: 24.05 KG/M2 | WEIGHT: 181.44 LBS | DIASTOLIC BLOOD PRESSURE: 81 MMHG | HEIGHT: 73 IN | HEART RATE: 91 BPM

## 2024-11-13 DIAGNOSIS — R07.2 PRECORDIAL PAIN: ICD-10-CM

## 2024-11-13 DIAGNOSIS — I42.0 DILATED CARDIOMYOPATHY: ICD-10-CM

## 2024-11-13 DIAGNOSIS — I45.10 RIGHT BUNDLE BRANCH BLOCK: ICD-10-CM

## 2024-11-13 DIAGNOSIS — I49.3 PVC (PREMATURE VENTRICULAR CONTRACTION): Primary | ICD-10-CM

## 2024-11-13 PROCEDURE — 99204 OFFICE O/P NEW MOD 45 MIN: CPT | Mod: S$GLB,,, | Performed by: INTERNAL MEDICINE

## 2024-11-13 PROCEDURE — 1159F MED LIST DOCD IN RCRD: CPT | Mod: CPTII,S$GLB,, | Performed by: INTERNAL MEDICINE

## 2024-11-13 PROCEDURE — 3008F BODY MASS INDEX DOCD: CPT | Mod: CPTII,S$GLB,, | Performed by: INTERNAL MEDICINE

## 2024-11-13 PROCEDURE — 3079F DIAST BP 80-89 MM HG: CPT | Mod: CPTII,S$GLB,, | Performed by: INTERNAL MEDICINE

## 2024-11-13 PROCEDURE — 3044F HG A1C LEVEL LT 7.0%: CPT | Mod: CPTII,S$GLB,, | Performed by: INTERNAL MEDICINE

## 2024-11-13 PROCEDURE — 3075F SYST BP GE 130 - 139MM HG: CPT | Mod: CPTII,S$GLB,, | Performed by: INTERNAL MEDICINE

## 2024-11-13 PROCEDURE — 99999 PR PBB SHADOW E&M-EST. PATIENT-LVL III: CPT | Mod: PBBFAC,,, | Performed by: INTERNAL MEDICINE

## 2024-11-13 RX ORDER — LANOLIN ALCOHOL/MO/W.PET/CERES
CREAM (GRAM) TOPICAL
Start: 2024-11-13

## 2024-11-13 NOTE — PROGRESS NOTES
Subjective:   11/13/2024     Patient ID:  Yuan Bains is a 34 y.o. male who presents for evaulation of Chest Pain      Comes in today for follow-up.  The patient had recent admission with chest pain, this was persistent over 2 days.  BNP and troponin were normal.  This was felt to be GI.  He has recently underwent endoscopy which was normal.  He does have a CT scheduled to look at his pancreas.      The patient is a long-distance runner, also coaches high jump at college level.    Cardiac evaluation a year ago for bundle branch block in therapy with Adderall showed left ventricular dilatation and mildly decreased systolic function on echocardiography.  He has not taken any stimulants since that time.  He is off of 5 hour energy drinks now.  Does not have exertional chest pains or tightness, no PND, no orthopnea.  No exertional dyspnea.      Patient did have a history of syncope in his youth that a proximally age 12.        History reviewed. No pertinent past medical history.    Review of patient's allergies indicates:  No Known Allergies      Current Outpatient Medications:     multivitamin (ONE DAILY MULTIVITAMIN) per tablet, Take 1 tablet by mouth once daily., Disp: , Rfl:     pantoprazole (PROTONIX) 40 MG tablet, Take 1 tablet (40 mg total) by mouth once daily., Disp: 30 tablet, Rfl: 11    magnesium oxide (MAG-OX) 400 mg (241.3 mg magnesium) tablet, Take 1 twice a day for 1 month, then 1 daily; if diarrhea occurs, decrease dose, Disp: , Rfl:      Objective:   Review of Systems   Cardiovascular:  Negative for chest pain, claudication, cyanosis, dyspnea on exertion, irregular heartbeat, leg swelling, near-syncope, orthopnea, palpitations, paroxysmal nocturnal dyspnea and syncope.         Vitals:    11/13/24 1355   BP: 132/81   Pulse: 91     Wt Readings from Last 3 Encounters:   11/13/24 82.3 kg (181 lb 7 oz)   11/07/24 84.1 kg (185 lb 6.5 oz)   10/31/24 80.3 kg (177 lb)     Temp Readings from Last 3 Encounters:    10/31/24 98.8 °F (37.1 °C) (Temporal)   10/25/24 98.4 °F (36.9 °C) (Oral)   07/05/23 98 °F (36.7 °C)     BP Readings from Last 3 Encounters:   11/13/24 132/81   11/07/24 138/80   10/31/24 110/63     Pulse Readings from Last 3 Encounters:   11/13/24 91   11/07/24 (!) 58   10/31/24 64             Physical Exam  Vitals reviewed.   Constitutional:       General: He is not in acute distress.     Appearance: He is well-developed.   HENT:      Head: Normocephalic and atraumatic.      Nose: Nose normal.   Eyes:      Conjunctiva/sclera: Conjunctivae normal.      Pupils: Pupils are equal, round, and reactive to light.   Neck:      Vascular: No carotid bruit or JVD.   Cardiovascular:      Rate and Rhythm: Normal rate and regular rhythm.      Pulses: Normal pulses and intact distal pulses.      Heart sounds: No murmur heard.     No friction rub. Gallop present. S4 sounds present.   Pulmonary:      Effort: Pulmonary effort is normal. No respiratory distress.      Breath sounds: Normal breath sounds. No wheezing or rales.   Chest:      Chest wall: No tenderness.   Abdominal:      General: Bowel sounds are normal. There is no distension.      Palpations: Abdomen is soft.      Tenderness: There is no abdominal tenderness.   Musculoskeletal:         General: No tenderness or deformity. Normal range of motion.      Cervical back: Normal range of motion and neck supple.      Right lower leg: No edema.      Left lower leg: No edema.   Skin:     General: Skin is warm and dry.      Findings: No erythema or rash.   Neurological:      Mental Status: He is alert and oriented to person, place, and time.      Cranial Nerves: No cranial nerve deficit.      Motor: No abnormal muscle tone.      Coordination: Coordination normal.   Psychiatric:         Behavior: Behavior normal.         Thought Content: Thought content normal.         Judgment: Judgment normal.           Lab Results   Component Value Date    CHOL 136 07/05/2023     Lab Results    Component Value Date    HDL 47 07/05/2023     Lab Results   Component Value Date    LDLCALC 79.8 07/05/2023     Lab Results   Component Value Date    ALT 29 11/07/2024    AST 28 11/07/2024    AST 27 10/25/2024    AST 24 07/05/2023     Lab Results   Component Value Date    CREATININE 1.3 11/07/2024    BUN 15 11/07/2024     11/07/2024    K 4.8 11/07/2024    CO2 28 11/07/2024    CO2 28 10/25/2024    CO2 30 (H) 07/05/2023     Lab Results   Component Value Date    HGB 14.6 11/07/2024    HCT 45.3 11/07/2024    HCT 43.5 10/25/2024    HCT 44.0 07/05/2023                         Assessment and Plan:     PVC (premature ventricular contraction)  Comments:  Magnesium supplementation recommended, decrease caffeine, no energy supplements  Orders:  -     magnesium oxide (MAG-OX) 400 mg (241.3 mg magnesium) tablet; Take 1 twice a day for 1 month, then 1 daily; if diarrhea occurs, decrease dose    Precordial pain  Comments:  Noncardiac  Orders:  -     Ambulatory referral/consult to Cardiology    Right bundle branch block  Comments:  Appears benign    Dilated cardiomyopathy  Comments:  Mild LV dysfunction noted last year, patient has no symptoms of heart failure.  Will repeat echocardiography  Orders:  -     Echo; Future; Expected date: 11/14/2024         No follow-ups on file.          Future Appointments   Date Time Provider Department Center   11/14/2024  9:00 AM Bandar Butt MD [facility] UROLOGY [facility]   11/14/2024  4:30 PM [facility] OIC-CT1 500 LB LIMIT [facility]   11/22/2024  8:00 AM [facility] ECHO [facility] CARD[facility] [place]   12/10/2024 11:00 AM Roya Joaquin MD [facility] [place]   12/30/2024 11:30 AM Rohith Lezama MD [facility] ENT [facility]   3/3/2025  9:20 AM Thiago Fairbanks MD [facility] DERM [place]

## 2024-11-14 ENCOUNTER — OFFICE VISIT (OUTPATIENT)
Dept: UROLOGY | Facility: CLINIC | Age: 34
End: 2024-11-14
Payer: COMMERCIAL

## 2024-11-14 ENCOUNTER — HOSPITAL ENCOUNTER (OUTPATIENT)
Dept: RADIOLOGY | Facility: HOSPITAL | Age: 34
Discharge: HOME | End: 2024-11-14
Attending: FAMILY MEDICINE
Payer: COMMERCIAL

## 2024-11-14 VITALS
HEART RATE: 75 BPM | BODY MASS INDEX: 24.2 KG/M2 | WEIGHT: 182.63 LBS | SYSTOLIC BLOOD PRESSURE: 129 MMHG | DIASTOLIC BLOOD PRESSURE: 77 MMHG | HEIGHT: 73 IN

## 2024-11-14 DIAGNOSIS — N50.811 PAIN IN RIGHT TESTICLE: ICD-10-CM

## 2024-11-14 DIAGNOSIS — R93.5 ABNORMAL ULTRASOUND OF ABDOMEN: ICD-10-CM

## 2024-11-14 PROCEDURE — 1159F MED LIST DOCD IN RCRD: CPT | Mod: CPTII,S$GLB,,

## 2024-11-14 PROCEDURE — 99203 OFFICE O/P NEW LOW 30 MIN: CPT | Mod: S$GLB,,,

## 2024-11-14 PROCEDURE — 25500020 PHARM REV CODE 255: Performed by: FAMILY MEDICINE

## 2024-11-14 PROCEDURE — 3074F SYST BP LT 130 MM HG: CPT | Mod: CPTII,S$GLB,,

## 2024-11-14 PROCEDURE — 74177 CT ABD & PELVIS W/CONTRAST: CPT | Mod: TC

## 2024-11-14 PROCEDURE — 74177 CT ABD & PELVIS W/CONTRAST: CPT | Mod: 26,,, | Performed by: RADIOLOGY

## 2024-11-14 PROCEDURE — 99999 PR PBB SHADOW E&M-EST. PATIENT-LVL III: CPT | Mod: PBBFAC,,,

## 2024-11-14 PROCEDURE — 3078F DIAST BP <80 MM HG: CPT | Mod: CPTII,S$GLB,,

## 2024-11-14 PROCEDURE — 1160F RVW MEDS BY RX/DR IN RCRD: CPT | Mod: CPTII,S$GLB,,

## 2024-11-14 PROCEDURE — 3044F HG A1C LEVEL LT 7.0%: CPT | Mod: CPTII,S$GLB,,

## 2024-11-14 PROCEDURE — 3008F BODY MASS INDEX DOCD: CPT | Mod: CPTII,S$GLB,,

## 2024-11-14 RX ORDER — IBUPROFEN 600 MG/1
600 TABLET ORAL 3 TIMES DAILY
Qty: 42 TABLET | Refills: 0 | Status: SHIPPED | OUTPATIENT
Start: 2024-11-14 | End: 2024-11-28

## 2024-11-14 RX ADMIN — IOHEXOL 100 ML: 350 INJECTION, SOLUTION INTRAVENOUS at 04:11

## 2024-11-14 NOTE — PROGRESS NOTES
"Subjective:       Patient ID: Yuan Bains is a 34 y.o. male.    Chief Complaint: Testicle Pain     This is a 34 y.o.  male patient that is new to me.  The patient was referred to me by Dr. Shipman for right testicular pain. Reports that he has a history cardiomyopathy, 2023, degenerative disc disease, a torn labrum in his R hip. He takes a muscle relaxer when pain occurs in his back. Patient also recently had an endo done for chest pain, showed scid reflux, taking protonix which is helping.  Reports testicle pain that is intermittent, pain is in the right testicle but he feels fullness in the left testicle. 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 Denies LUTS, dysuria, concern for an STD, penile discharge.  Scheduled for CT of abdomen and pelvis with and without contrast today.    LAST PSA  No results found for: "PSA", "PSADIAG", "PSATOTAL", "PSAFREE"    Lab Results   Component Value Date    CREATININE 1.3 11/07/2024       ---  PMH/PSH/Medications/Allergies/Social history reviewed and as in chart.    Review of Systems   Constitutional:  Negative for activity change, chills and fever.   Respiratory:  Negative for shortness of breath.    Cardiovascular:  Negative for chest pain and palpitations.   Gastrointestinal:  Negative for abdominal pain and constipation.   Genitourinary:  Positive for testicular pain. Negative for difficulty urinating, dysuria, flank pain, frequency, hematuria, penile discharge, penile pain, penile swelling, scrotal swelling and urgency.   Neurological:  Negative for dizziness and light-headedness.     Objective:      Physical Exam  HENT:      Head: Normocephalic.   Pulmonary:      Effort: Pulmonary effort is normal.   Abdominal:      General: Abdomen is flat.      Palpations: Abdomen is soft.   Genitourinary:     Penis: Normal and circumcised.       Testes:         Right: Mass, tenderness or swelling not present. Cremasteric reflex is present.          Left: " Mass, tenderness or swelling not present. Cremasteric reflex is present.    Musculoskeletal:         General: Normal range of motion.      Cervical back: Normal range of motion.   Skin:     General: Skin is warm and dry.   Neurological:      Mental Status: He is alert and oriented to person, place, and time.       Assessment:     Problem Noted   Pain in Right Testicle 11/14/2024       Plan:     Discussed testicular pain possibly being deferred pain from back and hip, assured him that based on assessment there is no concern for testicular mass, torsion, or hernia. Offered scrotal US but could wait after trying more conservative measures first, patient wishes to try conservative measures.   Conservative/behavioral management for testicular pain: NSAIDs (Ibuprofen 600mg three times daily as needed), supportive underwear, warm baths as needed, ice packs and scrotal elevation for 2 weeks daily. Patient voiced understanding.  Follow-up PRN for problems and concerns, if pain not getting better.    KATTY Edwards    I spent a total of 25 minutes on the day of the visit.This includes face to face time and non-face to face time preparing to see the patient (eg, review of tests), obtaining and/or reviewing separately obtained history, documenting clinical information in the electronic or other health record, independently interpreting results and communicating results to the patient/family/caregiver, or care coordinator.

## 2024-11-15 ENCOUNTER — PATIENT MESSAGE (OUTPATIENT)
Dept: INTERNAL MEDICINE | Facility: CLINIC | Age: 34
End: 2024-11-15
Payer: COMMERCIAL

## 2024-11-21 ENCOUNTER — PATIENT MESSAGE (OUTPATIENT)
Dept: CARDIOLOGY | Facility: CLINIC | Age: 34
End: 2024-11-21
Payer: COMMERCIAL

## 2024-11-22 ENCOUNTER — HOSPITAL ENCOUNTER (OUTPATIENT)
Dept: CARDIOLOGY | Facility: HOSPITAL | Age: 34
Discharge: HOME | End: 2024-11-22
Attending: INTERNAL MEDICINE
Payer: COMMERCIAL

## 2024-11-22 ENCOUNTER — PATIENT MESSAGE (OUTPATIENT)
Dept: CARDIOLOGY | Facility: CLINIC | Age: 34
End: 2024-11-22

## 2024-11-22 VITALS
BODY MASS INDEX: 24.12 KG/M2 | HEIGHT: 73 IN | HEART RATE: 79 BPM | SYSTOLIC BLOOD PRESSURE: 156 MMHG | DIASTOLIC BLOOD PRESSURE: 88 MMHG | WEIGHT: 182 LBS

## 2024-11-22 DIAGNOSIS — I42.0 DILATED CARDIOMYOPATHY: ICD-10-CM

## 2024-11-22 LAB
ASCENDING AORTA: 2.82 CM
AV AREA BY CONTINUOUS VTI: 3 CM2
AV INDEX (PROSTH): 0.66
AV LVOT MEAN GRADIENT: 1 MMHG
AV LVOT PEAK GRADIENT: 3 MMHG
AV MEAN GRADIENT: 2.9 MMHG
AV PEAK GRADIENT: 5.8 MMHG
AV VALVE AREA BY VELOCITY RATIO: 3 CM²
AV VALVE AREA: 3 CM2
AV VELOCITY RATIO: 0.67
BSA FOR ECHO PROCEDURE: 2.06 M2
CV ECHO LV RWT: 0.34 CM
DOP CALC AO PEAK VEL: 1.2 M/S
DOP CALC AO VTI: 22.2 CM
DOP CALC LVOT AREA: 4.5 CM2
DOP CALC LVOT DIAMETER: 2.4 CM
DOP CALC LVOT PEAK VEL: 0.8 M/S
DOP CALC LVOT STROKE VOLUME: 66.5 CM3
DOP CALC RVOT AREA: 3.05 CM2
DOP CALC RVOT DIAMETER: 1.97 CM
DOP CALCLVOT PEAK VEL VTI: 14.7 CM
E WAVE DECELERATION TIME: 221.02 MS
E/A RATIO: 1.51
E/E' RATIO: 3.71 M/S
ECHO EF ESTIMATED: 50 %
ECHO LV POSTERIOR WALL: 0.9 CM (ref 0.6–1.1)
FRACTIONAL SHORTENING: 24.5 % (ref 28–44)
GLOBAL LONGITUIDAL STRAIN: 19 %
HR MV ECHO: 79 BPM
INTERVENTRICULAR SEPTUM: 0.6 CM (ref 0.6–1.1)
IVC DIAMETER: 1.27 CM
IVRT: 88.49 MS
LA MAJOR: 4.53 CM
LA MINOR: 4.54 CM
LA WIDTH: 3.49 CM
LEFT ATRIUM SIZE: 2.61 CM
LEFT ATRIUM VOLUME INDEX MOD: 15.7 ML/M2
LEFT ATRIUM VOLUME INDEX: 17 ML/M2
LEFT ATRIUM VOLUME MOD: 32.4 ML
LEFT ATRIUM VOLUME: 35.11 CM3
LEFT INTERNAL DIMENSION IN SYSTOLE: 4 CM (ref 2.1–4)
LEFT VENTRICLE DIASTOLIC VOLUME INDEX: 66.39 ML/M2
LEFT VENTRICLE DIASTOLIC VOLUME: 137.42 ML
LEFT VENTRICLE MASS INDEX: 66.8 G/M2
LEFT VENTRICLE SYSTOLIC VOLUME INDEX: 33.3 ML/M2
LEFT VENTRICLE SYSTOLIC VOLUME: 68.84 ML
LEFT VENTRICULAR INTERNAL DIMENSION IN DIASTOLE: 5.3 CM (ref 3.5–6)
LEFT VENTRICULAR MASS: 138.3 G
LV LATERAL E/E' RATIO: 3.1
LV SEPTAL E/E' RATIO: 4.64
MV A" WAVE DURATION": 111.32 MS
MV PEAK A VEL: 0.43 M/S
MV PEAK E VEL: 0.65 M/S
OHS CV RV/LV RATIO: 0.58 CM
OHS LV EJECTION FRACTION SIMPSONS BIPLANE MOD: 61 %
PISA TR MAX VEL: 1.9 M/S
PULM VEIN A" WAVE DURATION": 111.32 MS
PULM VEIN S/D RATIO: 0.62
PULMONIC VEIN PEAK A VELOCITY: 0.4 M/S
PV PEAK D VEL: 0.81 M/S
PV PEAK S VEL: 0.5 M/S
RA MAJOR: 3.87 CM
RA PRESSURE ESTIMATED: 3 MMHG
RA WIDTH: 3.32 CM
RIGHT ATRIAL AREA: 10.8 CM2
RIGHT VENTRICLE DIASTOLIC BASEL DIMENSION: 3.1 CM
RV TB RVSP: 5 MMHG
RV TISSUE DOPPLER FREE WALL SYSTOLIC VELOCITY 1 (APICAL 4 CHAMBER VIEW): 18.01 CM/S
SINUS: 3.51 CM
STJ: 2.49 CM
TDI LATERAL: 0.21 M/S
TDI SEPTAL: 0.14 M/S
TDI: 0.18 M/S
TR MAX PG: 14 MMHG
TRICUSPID ANNULAR PLANE SYSTOLIC EXCURSION: 1.85 CM
TV PEAK GRADIENT: 15 MMHG
TV REST PULMONARY ARTERY PRESSURE: 17 MMHG
Z-SCORE OF LEFT VENTRICULAR DIMENSION IN END DIASTOLE: -1.72
Z-SCORE OF LEFT VENTRICULAR DIMENSION IN END SYSTOLE: 0.3

## 2024-11-22 PROCEDURE — 93356 MYOCRD STRAIN IMG SPCKL TRCK: CPT | Mod: ,,, | Performed by: INTERNAL MEDICINE

## 2024-11-22 PROCEDURE — 93306 TTE W/DOPPLER COMPLETE: CPT

## 2024-11-22 PROCEDURE — 93306 TTE W/DOPPLER COMPLETE: CPT | Mod: 26,,, | Performed by: INTERNAL MEDICINE

## 2024-12-10 ENCOUNTER — TELEPHONE (OUTPATIENT)
Dept: ALLERGY | Facility: CLINIC | Age: 34
End: 2024-12-10

## 2024-12-10 ENCOUNTER — OFFICE VISIT (OUTPATIENT)
Dept: ALLERGY | Facility: CLINIC | Age: 34
End: 2024-12-10
Payer: COMMERCIAL

## 2024-12-10 ENCOUNTER — LAB VISIT (OUTPATIENT)
Dept: LAB | Facility: HOSPITAL | Age: 34
End: 2024-12-10
Payer: COMMERCIAL

## 2024-12-10 VITALS — WEIGHT: 184.5 LBS | HEIGHT: 73 IN | BODY MASS INDEX: 24.45 KG/M2

## 2024-12-10 DIAGNOSIS — R13.10 DYSPHAGIA, UNSPECIFIED TYPE: ICD-10-CM

## 2024-12-10 DIAGNOSIS — J31.0 CHRONIC RHINITIS: Primary | ICD-10-CM

## 2024-12-10 DIAGNOSIS — J31.0 CHRONIC RHINITIS: ICD-10-CM

## 2024-12-10 PROCEDURE — 86003 ALLG SPEC IGE CRUDE XTRC EA: CPT | Mod: 59 | Performed by: STUDENT IN AN ORGANIZED HEALTH CARE EDUCATION/TRAINING PROGRAM

## 2024-12-10 PROCEDURE — 86003 ALLG SPEC IGE CRUDE XTRC EA: CPT | Performed by: STUDENT IN AN ORGANIZED HEALTH CARE EDUCATION/TRAINING PROGRAM

## 2024-12-10 PROCEDURE — 1159F MED LIST DOCD IN RCRD: CPT | Mod: CPTII,S$GLB,, | Performed by: STUDENT IN AN ORGANIZED HEALTH CARE EDUCATION/TRAINING PROGRAM

## 2024-12-10 PROCEDURE — 3008F BODY MASS INDEX DOCD: CPT | Mod: CPTII,S$GLB,, | Performed by: STUDENT IN AN ORGANIZED HEALTH CARE EDUCATION/TRAINING PROGRAM

## 2024-12-10 PROCEDURE — 36415 COLL VENOUS BLD VENIPUNCTURE: CPT | Performed by: STUDENT IN AN ORGANIZED HEALTH CARE EDUCATION/TRAINING PROGRAM

## 2024-12-10 PROCEDURE — 99999 PR PBB SHADOW E&M-EST. PATIENT-LVL III: CPT | Mod: PBBFAC,,, | Performed by: STUDENT IN AN ORGANIZED HEALTH CARE EDUCATION/TRAINING PROGRAM

## 2024-12-10 PROCEDURE — 3044F HG A1C LEVEL LT 7.0%: CPT | Mod: CPTII,S$GLB,, | Performed by: STUDENT IN AN ORGANIZED HEALTH CARE EDUCATION/TRAINING PROGRAM

## 2024-12-10 PROCEDURE — 99204 OFFICE O/P NEW MOD 45 MIN: CPT | Mod: S$GLB,,, | Performed by: STUDENT IN AN ORGANIZED HEALTH CARE EDUCATION/TRAINING PROGRAM

## 2024-12-10 RX ORDER — PSEUDOEPHEDRINE HCL 120 MG/1
120 TABLET, FILM COATED, EXTENDED RELEASE ORAL
COMMUNITY

## 2024-12-10 NOTE — PROGRESS NOTES
"[facility]GY [facility]OLOGY CLINIC   HISTORY OF PRESENT ILLNESS   Referral from: Brigette Rosa  CC:   Chief Complaint   Patient presents with    Allergies       HPI: Yuan Bains is a 34 y.o. male  History obtained from patient  3 months ago developed severe chest pain lasted 2 days went to ER did US abdomen, endoscopy, cardiac eval including echo, did CT abdomen  Pantoprazole but symptoms still happen  Was eating mixed nuts at that time and friend has almond reaction of chest pain and other friend has intolerance to eggs    Drug Allergies: Review of patient's allergies indicates:  No Known Allergies      MEDICAL HISTORY   SurgHx:  Past Surgical History:   Procedure Laterality Date    ESOPHAGOGASTRODUODENOSCOPY N/A 10/31/2024    Procedure: EGD (ESOPHAGOGASTRODUODENOSCOPY);  Surgeon: Jose David Ferreira MD;  Location: [facility];  Service: Endoscopy;  Laterality: N/A;    SINUS SURGERY          PHYSICAL EXAM   VS: Ht 6' 1" (1.854 m)   Wt 83.7 kg (184 lb 8.4 oz)   BMI 24.35 kg/m²   GENERAL: NAD, well nourished, well appearing  EYES: no conjunctival injection, no discharge, no infraorbital shiners  EARS: external auditory canals normal B/L  LUNGS: no increased WOB  DERM: no rashes     ASSESSMENT & PLAN     Chronic rhinitis  - Amb alg panel ordered  - Reviewed treatment options, deferred for now    Dysphagia  - Recent scope negative, does not think he was on PPI, as symptoms were suspicious for EOE (also had negative celiac testing on scope)  - Felt a little better on PPI and when quit coffee  - Can stop or continue PPI  - No good food allergy tests for this, reviewed elimination trial if suspected trigger food    Follow up: in 1 week to review labs (aware that will not get message or call about them as this is my last week), may schedule adjacent to his son for eval of rhinitis    I spent a total of 40 minutes on the day of the visit. This includes face to face time and non-face to face time preparing to see the patient (eg, " review of tests), obtaining and/or reviewing separately obtained history, documenting clinical information in the electronic or other health record, independently interpreting results and communicating results to the patient/family/caregiver, or care coordinator.

## 2024-12-13 ENCOUNTER — PATIENT MESSAGE (OUTPATIENT)
Dept: ALLERGY | Facility: CLINIC | Age: 34
End: 2024-12-13
Payer: COMMERCIAL

## 2024-12-13 ENCOUNTER — TELEPHONE (OUTPATIENT)
Dept: OTOLARYNGOLOGY | Facility: CLINIC | Age: 34
End: 2024-12-13
Payer: COMMERCIAL

## 2024-12-13 LAB
A ALTERNATA IGE QN: <0.1 KU/L
A FUMIGATUS IGE QN: <0.1 KU/L
BERMUDA GRASS IGE QN: <0.1 KU/L
CAT DANDER IGE QN: <0.1 KU/L
CEDAR IGE QN: <0.1 KU/L
D FARINAE IGE QN: 2.99 KU/L
D PTERONYSS IGE QN: 2.23 KU/L
DEPRECATED CEDAR IGE RAST QL: NORMAL
DEPRECATED TIMOTHY IGE RAST QL: NORMAL
DOG DANDER IGE QN: <0.1 KU/L
ELDER IGE QN: 0.63 KU/L
ENGL PLANTAIN IGE QN: <0.1 KU/L
PECAN/HICK TREE IGE QN: <0.1 KU/L
RAST CLASS: ABNORMAL
RAST CLASS: NORMAL
TIMOTHY IGE QN: <0.1 KU/L
WEST RAGWEED IGE QN: <0.1 KU/L
WHITE OAK IGE QN: <0.1 KU/L

## 2024-12-16 NOTE — TELEPHONE ENCOUNTER
Patient was called and an in person was made for him and 2 virtual appts made for his children to establish care. He wanted to schedule his children second appt for skin testing but I informed him that he should wait until after he first appt when he discuss with provider the plan of care.

## 2024-12-19 RX ORDER — AMOXICILLIN AND CLAVULANATE POTASSIUM 875; 125 MG/1; MG/1
1 TABLET, FILM COATED ORAL EVERY 12 HOURS
Qty: 20 TABLET | Refills: 0 | Status: SHIPPED | OUTPATIENT
Start: 2024-12-19 | End: 2024-12-29

## 2024-12-19 NOTE — PROGRESS NOTES
Sinus infection ongoing for > 3 weeks. Green yellow brown discharge with facial pain and pressure.  Augmentin sent in.

## 2025-01-06 ENCOUNTER — TELEPHONE (OUTPATIENT)
Dept: OTOLARYNGOLOGY | Facility: CLINIC | Age: 35
End: 2025-01-06

## 2025-01-06 ENCOUNTER — OFFICE VISIT (OUTPATIENT)
Dept: OTOLARYNGOLOGY | Facility: CLINIC | Age: 35
End: 2025-01-06
Payer: COMMERCIAL

## 2025-01-06 DIAGNOSIS — J34.2 NASAL SEPTAL DEVIATION: ICD-10-CM

## 2025-01-06 DIAGNOSIS — J34.89 NASAL OBSTRUCTION: ICD-10-CM

## 2025-01-06 DIAGNOSIS — J34.829 NASAL VALVE COLLAPSE: Primary | ICD-10-CM

## 2025-01-06 DIAGNOSIS — J34.2 DEVIATED SEPTUM: Primary | ICD-10-CM

## 2025-01-06 PROCEDURE — 1160F RVW MEDS BY RX/DR IN RCRD: CPT | Mod: CPTII,S$GLB,, | Performed by: STUDENT IN AN ORGANIZED HEALTH CARE EDUCATION/TRAINING PROGRAM

## 2025-01-06 PROCEDURE — 1159F MED LIST DOCD IN RCRD: CPT | Mod: CPTII,S$GLB,, | Performed by: STUDENT IN AN ORGANIZED HEALTH CARE EDUCATION/TRAINING PROGRAM

## 2025-01-06 PROCEDURE — 99204 OFFICE O/P NEW MOD 45 MIN: CPT | Mod: 25,S$GLB,, | Performed by: STUDENT IN AN ORGANIZED HEALTH CARE EDUCATION/TRAINING PROGRAM

## 2025-01-06 PROCEDURE — 31231 NASAL ENDOSCOPY DX: CPT | Mod: S$GLB,,, | Performed by: STUDENT IN AN ORGANIZED HEALTH CARE EDUCATION/TRAINING PROGRAM
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
## 2025-01-29 ENCOUNTER — TELEPHONE (OUTPATIENT)
Dept: ALLERGY | Facility: CLINIC | Age: 35
End: 2025-01-29
Payer: COMMERCIAL

## 2025-01-29 NOTE — TELEPHONE ENCOUNTER
Spoke with patient to offer to keep appt to discuss lab results or cancel because skin testing will not be needed per Dr. Lockett. Pt opted to cancel appt.

## 2025-02-28 ENCOUNTER — TELEPHONE (OUTPATIENT)
Dept: DERMATOLOGY | Facility: CLINIC | Age: 35
End: 2025-02-28
Payer: COMMERCIAL

## 2025-03-03 ENCOUNTER — OFFICE VISIT (OUTPATIENT)
Dept: DERMATOLOGY | Facility: CLINIC | Age: 35
End: 2025-03-03
Payer: COMMERCIAL

## 2025-03-03 DIAGNOSIS — D22.9 MULTIPLE BENIGN NEVI: ICD-10-CM

## 2025-03-03 DIAGNOSIS — L72.0 MILIA: ICD-10-CM

## 2025-03-03 DIAGNOSIS — D17.9 MULTIPLE LIPOMAS: ICD-10-CM

## 2025-03-03 DIAGNOSIS — D48.5 NEOPLASM OF UNCERTAIN BEHAVIOR OF SKIN: Primary | ICD-10-CM

## 2025-03-03 DIAGNOSIS — Z12.83 SCREENING EXAM FOR SKIN CANCER: ICD-10-CM

## 2025-03-03 DIAGNOSIS — L81.4 LENTIGO: ICD-10-CM

## 2025-03-03 PROCEDURE — 99999 PR PBB SHADOW E&M-EST. PATIENT-LVL III: CPT | Mod: PBBFAC,,, | Performed by: STUDENT IN AN ORGANIZED HEALTH CARE EDUCATION/TRAINING PROGRAM

## 2025-03-03 PROCEDURE — 88305 TISSUE EXAM BY PATHOLOGIST: CPT | Performed by: DERMATOLOGY

## 2025-03-03 PROCEDURE — 1160F RVW MEDS BY RX/DR IN RCRD: CPT | Mod: CPTII,S$GLB,, | Performed by: STUDENT IN AN ORGANIZED HEALTH CARE EDUCATION/TRAINING PROGRAM

## 2025-03-03 PROCEDURE — 1159F MED LIST DOCD IN RCRD: CPT | Mod: CPTII,S$GLB,, | Performed by: STUDENT IN AN ORGANIZED HEALTH CARE EDUCATION/TRAINING PROGRAM

## 2025-03-03 PROCEDURE — 99203 OFFICE O/P NEW LOW 30 MIN: CPT | Mod: 25,S$GLB,, | Performed by: STUDENT IN AN ORGANIZED HEALTH CARE EDUCATION/TRAINING PROGRAM

## 2025-03-03 PROCEDURE — 11104 PUNCH BX SKIN SINGLE LESION: CPT | Mod: S$GLB,,, | Performed by: STUDENT IN AN ORGANIZED HEALTH CARE EDUCATION/TRAINING PROGRAM

## 2025-03-03 NOTE — PATIENT INSTRUCTIONS
Sunscreen Guidelines  Sunscreen protects your skin by absorbing and reflecting ultraviolet rays. All sunscreens have a sun protection factor (SPF) rating that indicates how long a sunscreen will remain effective on the skin.    Why protect your skin?  The sun's rays are composed of many different wavelengths, including UVA, UVB, and visible light that each affect the skin differently.    UVB: sunburn, photoaging, skin cancer (melanoma, basal cell, and squamous cell carcinomas) and modulation of the skin's immune system.    UVA: similar to above but thought to contribute more to aging; at the same dose of UVB it is less powerful however the sun has 10-20 times the levels of UVA as compared with UVB.  Visible light: implicated in causing unwanted darkening of skin, such as melasma and post-inflammatory hyperpigmentation in darker skin types     If I have dark skin, do I need to worry about the sun?    More darkly pigmented skin is more protected against UV-induced skin cancer, sunburn, and photoaging, though may still suffer from sun-related conditions, including melasma, hyperpigmentation, and other dark spots.    Sun avoidance  As a general rule, stay out of the sun as much as possible between 10 a.m. - 4 p.m.    Download the EPA UV index yung to track the UV index by hour in your zip code.      Which sunscreen should I choose?  The best sunscreen to use varies by individual. The one that feels best on your skin and fits your lifestyle will be the one you will likely use most regularly.   Active ingredients of sunscreen vary by , and may be a chemical (such as avobenzone or oxybenzone) or physical agent (such as zinc oxide or titanium dioxide). I recommend a physical agent.  A water-resistant sunscreen is one that maintains the SPF level after 40 minutes of water exposure. A very water-resistant sunscreen maintains the SPF level after 80 minutes of water exposure.    Sunscreen: this is the last layer in  "sun protection   Be generous: 1 shot glass of sunscreen for your body, ½ teaspoon for your face/neck  Reapply every 2 hours  Broad spectrum (provides UVA/UVB protection), SPF 50 or above  Avoid spray sunscreens: less effective and have been found to contain benzene (carcinogen)    Sun protective clothing  Although sunscreen helps minimize sun damage, no sunscreen completely blocks all wavelengths of UV light. Wearing sun protective clothing such as hats, rashguards or swim shirts, and long sleeves and/or pants, as well as avoiding sun exposure from 10 a.m. to 4 p.m. will help protect your skin from overexposure and minimize sun damage. Seek shade.  Long sleeved clothing, hats, and sunglasses: makes sun protection easier, more effective, and can even be more affordable, since sunscreen needs to be reapplied frequently.    Solumbra (www.sunprectautions.[employer])  Hortonworks (www.[employer])  Coolibar (www.Cloudsnap.[employer]illiPay Systems)  Land's end (www.[employer])  Hats from [employer] (www.helenkaminski.com)    My Favorite Sunscreens:  Physical blockers: Can have a "white case" but in general are more effective  - Face: CeraVe tinted mineral sunscreen, Bare Minerals complexion rescue (20 shades), Elta MD (UV elements, UV physical, UV restore, etc), Tizo ultra zinc tinted, Cetaphil Sheer Mineral Face Liquid Sunscreen  - Body: Blue Lizard, Neutrogena Sheer Zinc, Eucerin Daily Protection, Aveeno Baby   "

## 2025-03-03 NOTE — PROGRESS NOTES
Subjective:      Patient ID:  Yuan Bains is a 34 y.o. male who presents for   Chief Complaint   Patient presents with    Skin Check     TBSE     Patient here for total Body Skin Exam    Last seen by dermatologist:2017    none - personal history of atypical moles removed  none - personal history of MM   Yes, grandfather - family history of MM  yes - childhood blistering sunburns  yes - tanning bed use  none - personal history of NMSC    Patient with specific complaint of lesion(s)  Location: r temple  Duration: 6 months  Symptoms: tender  Relieving factors/Previous treatments: none    Patient with new area of concern:   Location: diffuse- lipomas   Previous treatments: none        Review of Systems   Skin:  Positive for daily sunscreen use, activity-related sunscreen use, recent sunburn and wears hat.   Hematologic/Lymphatic: Does not bruise/bleed easily.       Objective:   Physical Exam   Constitutional: He appears well-developed and well-nourished. No distress.   Neurological: He is alert and oriented to person, place, and time. He is not disoriented.   Psychiatric: He has a normal mood and affect.   Skin:   Areas Examined (abnormalities noted in diagram):   Scalp / Hair Palpated and Inspected  Head / Face Inspection Performed  Neck Inspection Performed  Chest / Axilla Inspection Performed  Abdomen Inspection Performed  Back Inspection Performed  RUE Inspected  LUE Inspection Performed  RLE Inspected  LLE Inspection Performed  Nails and Digits Inspection Performed                     Diagram Legend     Erythematous scaling macule/papule c/w actinic keratosis       Vascular papule c/w angioma      Pigmented verrucoid papule/plaque c/w seborrheic keratosis      Yellow umbilicated papule c/w sebaceous hyperplasia      Irregularly shaped tan macule c/w lentigo     1-2 mm smooth white papules consistent with Milia      Movable subcutaneous cyst with punctum c/w epidermal inclusion cyst      Subcutaneous movable cyst  c/w pilar cyst      Firm pink to brown papule c/w dermatofibroma      Pedunculated fleshy papule(s) c/w skin tag(s)      Evenly pigmented macule c/w junctional nevus     Mildly variegated pigmented, slightly irregular-bordered macule c/w mildly atypical nevus      Flesh colored to evenly pigmented papule c/w intradermal nevus       Pink pearly papule/plaque c/w basal cell carcinoma      Erythematous hyperkeratotic cursted plaque c/w SCC      Surgical scar with no sign of skin cancer recurrence      Open and closed comedones      Inflammatory papules and pustules      Verrucoid papule consistent consistent with wart     Erythematous eczematous patches and plaques     Dystrophic onycholytic nail with subungual debris c/w onychomycosis     Umbilicated papule    Erythematous-base heme-crusted tan verrucoid plaque consistent with inflamed seborrheic keratosis     Erythematous Silvery Scaling Plaque c/w Psoriasis     See annotation            Assessment / Plan:      Pathology Orders:       Normal Orders This Visit    Specimen to Pathology, Dermatology     Questions:    Procedure Type: Dermatology and skin neoplasms    Number of Specimens: 1    ------------------------: -------------------------    Spec 1 Procedure: Biopsy    Spec 1 Clinical Impression: NMSC vs scar vs other    Spec 1 Source: right temple    Release to patient: Immediate    Release to patient:           Neoplasm of uncertain behavior of skin  -     Specimen to Pathology, Dermatology    Punch biopsy procedure note:  Punch biopsy performed after verbal consent obtained. Area marked and prepped with alcohol. Approximately 1cc of 1% lidocaine with epinephrine injected. 2 mm disposable punch used to remove lesion. Hemostasis obtained and biopsy site closed with 1 - 2 Prolene sutures. Wound care instructions reviewed with patient and handout given.      Multiple lipomas  Reassurance given to patient on benign nature.  Discussed treatment options - excision vs  observation. If lesion is not treated, it may grow and become intermittently inflamed. Discussed RBSE of surgery including scar, infection, bleeding and recurrence. Patient voiced understanding and would like to defer treatment for now.    If he would like to have them removed would likely send to gen surg given the large number of them    Multiple benign nevi  Lentigo  Milia  Reassurance given to patient. No treatment is necessary.   Treatment of benign, asymptomatic lesions may be considered cosmetic.    Screening exam for skin cancer  Total body skin examination performed today including at least 12 points as noted in physical examination. Suspicious lesions noted.    Recommend daily sun protection/avoidance, use of at least SPF 30, broad spectrum sunscreen (OTC drug), skin self examinations, and routine physician surveillance to optimize early detection             Follow up if symptoms worsen or fail to improve.

## 2025-03-06 ENCOUNTER — TELEPHONE (OUTPATIENT)
Dept: OTOLARYNGOLOGY | Facility: CLINIC | Age: 35
End: 2025-03-06
Payer: COMMERCIAL

## 2025-03-07 ENCOUNTER — RESULTS FOLLOW-UP (OUTPATIENT)
Dept: DERMATOLOGY | Facility: CLINIC | Age: 35
End: 2025-03-07

## 2025-03-07 LAB
FINAL PATHOLOGIC DIAGNOSIS: NORMAL
GROSS: NORMAL
Lab: NORMAL
MICROSCOPIC EXAM: NORMAL

## 2025-03-11 ENCOUNTER — CLINICAL SUPPORT (OUTPATIENT)
Dept: DERMATOLOGY | Facility: CLINIC | Age: 35
End: 2025-03-11
Payer: COMMERCIAL

## 2025-03-11 DIAGNOSIS — Z48.02 VISIT FOR SUTURE REMOVAL: Primary | ICD-10-CM

## 2025-03-11 PROCEDURE — 99024 POSTOP FOLLOW-UP VISIT: CPT | Mod: S$GLB,,, | Performed by: STUDENT IN AN ORGANIZED HEALTH CARE EDUCATION/TRAINING PROGRAM
